# Patient Record
Sex: FEMALE | Race: WHITE | NOT HISPANIC OR LATINO | Employment: FULL TIME | URBAN - METROPOLITAN AREA
[De-identification: names, ages, dates, MRNs, and addresses within clinical notes are randomized per-mention and may not be internally consistent; named-entity substitution may affect disease eponyms.]

---
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
## 2023-11-12 LAB
ALBUMIN SERPL-MCNC: 4.1 G/DL (ref 3.5–5.2)
ALBUMIN/GLOB SERPL: 1.4 G/DL
ALP SERPL-CCNC: 62 U/L (ref 39–117)
ALT SERPL W P-5'-P-CCNC: 33 U/L (ref 1–33)
ANION GAP SERPL CALCULATED.3IONS-SCNC: 10 MMOL/L (ref 5–15)
AST SERPL-CCNC: 19 U/L (ref 1–32)
B-HCG UR QL: POSITIVE
BASOPHILS # BLD AUTO: 0.08 10*3/MM3 (ref 0–0.2)
BASOPHILS NFR BLD AUTO: 0.5 % (ref 0–1.5)
BILIRUB SERPL-MCNC: 0.2 MG/DL (ref 0–1.2)
BILIRUB UR QL STRIP: NEGATIVE
BUN SERPL-MCNC: 9 MG/DL (ref 6–20)
BUN/CREAT SERPL: 13.2 (ref 7–25)
CALCIUM SPEC-SCNC: 9.2 MG/DL (ref 8.6–10.5)
CHLORIDE SERPL-SCNC: 105 MMOL/L (ref 98–107)
CLARITY UR: CLEAR
CO2 SERPL-SCNC: 25 MMOL/L (ref 22–29)
COLOR UR: YELLOW
CREAT SERPL-MCNC: 0.68 MG/DL (ref 0.57–1)
DEPRECATED RDW RBC AUTO: 41.5 FL (ref 37–54)
EGFRCR SERPLBLD CKD-EPI 2021: 128 ML/MIN/1.73
EOSINOPHIL # BLD AUTO: 0.28 10*3/MM3 (ref 0–0.4)
EOSINOPHIL NFR BLD AUTO: 1.8 % (ref 0.3–6.2)
ERYTHROCYTE [DISTWIDTH] IN BLOOD BY AUTOMATED COUNT: 12.7 % (ref 12.3–15.4)
EXPIRATION DATE: ABNORMAL
GLOBULIN UR ELPH-MCNC: 2.9 GM/DL
GLUCOSE SERPL-MCNC: 84 MG/DL (ref 65–99)
GLUCOSE UR STRIP-MCNC: NEGATIVE MG/DL
HCT VFR BLD AUTO: 45.3 % (ref 34–46.6)
HGB BLD-MCNC: 14.5 G/DL (ref 12–15.9)
HGB UR QL STRIP.AUTO: NEGATIVE
IMM GRANULOCYTES # BLD AUTO: 0.06 10*3/MM3 (ref 0–0.05)
IMM GRANULOCYTES NFR BLD AUTO: 0.4 % (ref 0–0.5)
INTERNAL NEGATIVE CONTROL: NEGATIVE
INTERNAL POSITIVE CONTROL: POSITIVE
KETONES UR QL STRIP: NEGATIVE
LEUKOCYTE ESTERASE UR QL STRIP.AUTO: NEGATIVE
LIPASE SERPL-CCNC: 23 U/L (ref 13–60)
LYMPHOCYTES # BLD AUTO: 4.01 10*3/MM3 (ref 0.7–3.1)
LYMPHOCYTES NFR BLD AUTO: 25.5 % (ref 19.6–45.3)
Lab: ABNORMAL
MCH RBC QN AUTO: 28.4 PG (ref 26.6–33)
MCHC RBC AUTO-ENTMCNC: 32 G/DL (ref 31.5–35.7)
MCV RBC AUTO: 88.6 FL (ref 79–97)
MONOCYTES # BLD AUTO: 1.08 10*3/MM3 (ref 0.1–0.9)
MONOCYTES NFR BLD AUTO: 6.9 % (ref 5–12)
NEUTROPHILS NFR BLD AUTO: 10.2 10*3/MM3 (ref 1.7–7)
NEUTROPHILS NFR BLD AUTO: 64.9 % (ref 42.7–76)
NITRITE UR QL STRIP: NEGATIVE
NRBC BLD AUTO-RTO: 0 /100 WBC (ref 0–0.2)
PH UR STRIP.AUTO: 6 [PH] (ref 5–8)
PLATELET # BLD AUTO: 406 10*3/MM3 (ref 140–450)
PMV BLD AUTO: 10.2 FL (ref 6–12)
POTASSIUM SERPL-SCNC: 3.8 MMOL/L (ref 3.5–5.2)
PROT SERPL-MCNC: 7 G/DL (ref 6–8.5)
PROT UR QL STRIP: NEGATIVE
RBC # BLD AUTO: 5.11 10*6/MM3 (ref 3.77–5.28)
SODIUM SERPL-SCNC: 140 MMOL/L (ref 136–145)
SP GR UR STRIP: 1.02 (ref 1–1.03)
UROBILINOGEN UR QL STRIP: NORMAL
WBC NRBC COR # BLD: 15.71 10*3/MM3 (ref 3.4–10.8)

## 2023-11-12 PROCEDURE — 36415 COLL VENOUS BLD VENIPUNCTURE: CPT

## 2023-11-12 PROCEDURE — 83690 ASSAY OF LIPASE: CPT | Performed by: EMERGENCY MEDICINE

## 2023-11-12 PROCEDURE — 84702 CHORIONIC GONADOTROPIN TEST: CPT | Performed by: EMERGENCY MEDICINE

## 2023-11-12 PROCEDURE — 99284 EMERGENCY DEPT VISIT MOD MDM: CPT

## 2023-11-12 PROCEDURE — 85025 COMPLETE CBC W/AUTO DIFF WBC: CPT | Performed by: EMERGENCY MEDICINE

## 2023-11-12 PROCEDURE — 81003 URINALYSIS AUTO W/O SCOPE: CPT | Performed by: EMERGENCY MEDICINE

## 2023-11-12 PROCEDURE — 81025 URINE PREGNANCY TEST: CPT | Performed by: EMERGENCY MEDICINE

## 2023-11-12 PROCEDURE — 80053 COMPREHEN METABOLIC PANEL: CPT | Performed by: EMERGENCY MEDICINE

## 2023-11-12 RX ORDER — ACETAMINOPHEN 500 MG
1000 TABLET ORAL ONCE
Status: COMPLETED | OUTPATIENT
Start: 2023-11-12 | End: 2023-11-13

## 2023-11-12 RX ORDER — SODIUM CHLORIDE 9 MG/ML
10 INJECTION INTRAVENOUS AS NEEDED
Status: DISCONTINUED | OUTPATIENT
Start: 2023-11-12 | End: 2023-11-13 | Stop reason: HOSPADM

## 2023-11-13 ENCOUNTER — APPOINTMENT (OUTPATIENT)
Dept: ULTRASOUND IMAGING | Facility: HOSPITAL | Age: 18
End: 2023-11-13
Payer: MEDICAID

## 2023-11-13 ENCOUNTER — HOSPITAL ENCOUNTER (EMERGENCY)
Facility: HOSPITAL | Age: 18
Discharge: HOME | End: 2023-11-13
Attending: EMERGENCY MEDICINE | Admitting: EMERGENCY MEDICINE
Payer: MEDICAID

## 2023-11-13 VITALS
SYSTOLIC BLOOD PRESSURE: 126 MMHG | HEIGHT: 63 IN | RESPIRATION RATE: 18 BRPM | TEMPERATURE: 97.6 F | HEART RATE: 92 BPM | WEIGHT: 252 LBS | DIASTOLIC BLOOD PRESSURE: 77 MMHG | BODY MASS INDEX: 44.65 KG/M2 | OXYGEN SATURATION: 100 %

## 2023-11-13 DIAGNOSIS — R10.9 ABDOMINAL PAIN DURING PREGNANCY IN FIRST TRIMESTER: Primary | ICD-10-CM

## 2023-11-13 DIAGNOSIS — O26.891 ABDOMINAL PAIN DURING PREGNANCY IN FIRST TRIMESTER: Primary | ICD-10-CM

## 2023-11-13 LAB
HCG INTACT+B SERPL-ACNC: NORMAL MIU/ML
HOLD SPECIMEN: NORMAL
HOLD SPECIMEN: NORMAL
WHOLE BLOOD HOLD COAG: NORMAL
WHOLE BLOOD HOLD SPECIMEN: NORMAL

## 2023-11-13 PROCEDURE — 25810000003 SODIUM CHLORIDE 0.9 % SOLUTION: Performed by: EMERGENCY MEDICINE

## 2023-11-13 PROCEDURE — 76817 TRANSVAGINAL US OBSTETRIC: CPT

## 2023-11-13 RX ADMIN — SODIUM CHLORIDE 1000 ML: 9 INJECTION, SOLUTION INTRAVENOUS at 02:29

## 2023-11-13 RX ADMIN — ACETAMINOPHEN 1000 MG: 500 TABLET ORAL at 02:33

## 2023-11-13 NOTE — ED PROVIDER NOTES
Subjective   History of Present Illness  This is a 18-year-old female presented to the emergency department with some right-sided abdominal discomfort.  Patient has had this for the last 4 days or so.  Patient states that she is currently pregnant.  Roughly 6 weeks.  G1, P0.  She start developing some pain in the right lower side.  States that it radiates up and around her back to her right upper side.  Been consistent in nature.  She has been mildly nauseous.  Denies any vomiting.  Patient has not had any vaginal discharge or bleeding.  Denies any trauma.  She does not have any headache or change in vision.  No focal weakness.  No chest pain or shortness of breath    History provided by:  Patient   used: No        Review of Systems   Constitutional:  Negative for chills and fever.   HENT:  Negative for congestion, ear pain and sore throat.    Eyes:  Negative for visual disturbance.   Respiratory:  Negative for shortness of breath.    Cardiovascular:  Negative for chest pain.   Gastrointestinal:  Positive for abdominal pain.   Genitourinary:  Negative for difficulty urinating.   Musculoskeletal:  Negative for arthralgias.   Skin:  Negative for rash.   Neurological:  Negative for dizziness, weakness and numbness.   Psychiatric/Behavioral:  Negative for agitation.        No past medical history on file.    No Known Allergies    No past surgical history on file.    No family history on file.    Social History     Socioeconomic History    Marital status: Single   Tobacco Use    Smoking status: Every Day    Smokeless tobacco: Never   Vaping Use    Vaping Use: Every day           Objective   Physical Exam  Vitals and nursing note reviewed.   Constitutional:       General: She is not in acute distress.     Appearance: She is not ill-appearing or toxic-appearing.   HENT:      Mouth/Throat:      Pharynx: No posterior oropharyngeal erythema.   Eyes:      Conjunctiva/sclera: Conjunctivae normal.       Pupils: Pupils are equal, round, and reactive to light.   Cardiovascular:      Rate and Rhythm: Normal rate and regular rhythm.   Pulmonary:      Effort: Pulmonary effort is normal. No respiratory distress.   Abdominal:      General: Abdomen is flat. There is no distension.      Palpations: There is no mass.      Tenderness: There is no abdominal tenderness. There is no guarding or rebound.   Musculoskeletal:         General: No deformity. Normal range of motion.   Skin:     General: Skin is warm.      Findings: No rash.   Neurological:      General: No focal deficit present.      Mental Status: She is alert and oriented to person, place, and time.      Motor: No weakness.         Procedures           ED Course  ED Course as of 11/13/23 0345   Mon Nov 13, 2023   0300 BP: 128/81 [JK]   0300 Temp src: Oral [JK]   0300 Heart Rate: 72 [JK]   0300 Resp: 18 [JK]   0300 SpO2: 99 %  Patient's repeat vitals, telemetry tracing, and pulse oximetry tracing were directly viewed and interpreted by myself.  Patient is hemodynamically stable [JK]   0300 Urinalysis With Microscopic If Indicated (No Culture) - Urine, Clean Catch [JK]   0300 hCG, Quantitative, Pregnancy [JK]   0300 POC Urine Pregnancy(!) [JK]   0300 Lipase [JK]   0300 Comprehensive Metabolic Panel [JK]   0300 CBC & Differential(!)  Laboratory studies were reviewed and interpreted directly by myself.  Urinalysis was normal, hCG quant was 15,568, lipase normal, CMP normal, CBC shows mild leukocytosis with a white blood cell count of 15.71 [JK]   0343 US Ob Transvaginal  Imaging was directly visualized by myself, per my interpretations, transvaginal ultrasound revealed yolk sac. [JK]   0343 I have discussed with the patient regarding the findings.  She is pain-free at this time.  Tolerating oral intake.  Repeat abdominal examination is benign.  Findings of the ultrasound are early and difficult to rule out ectopic pregnancy.  She will need a follow-up hCG quant by her  primary OB/GYN within 72 hours.  Patient given strict return precautions.  Verbalized understanding. [JK]   3089 I had a discussion with the patient/family regarding diagnosis, diagnostic results, treatment plan, and medications. The patient/family indicated understanding of these instructions. I spent adequate time at the bedside prior to discharge necessary to discuss the aftercare instructions, giving patient education, providing explanations of the results of our evaluations/findings, and my decision making to assure that the patient/family understand the plan of care. Time was allotted to answer questions at that time and throughout the ED course. Patient is required to maintain timely follow up, as discussed. I also discussed the potential for the development of an acute emergent condition requiring further evaluation, return to the ER, admission, or even surgical intervention.  I encouraged the patient to return to the emergency department immediately for any concerns, worsening symptoms, new complaints, or if symptoms persist and they are unable to seek follow-up in a timely fashion. The patient/family expressed understanding and agreement with this plan    Shared decision making:   After full review of the patient's clinical presentation, review of any work-up including but not limited to laboratory studies and radiology obtained, I had a discussion with the patient.  Treatment options were discussed as well as the risks, benefits and consequences.  I discussed all findings with the patient and family members if available.  During the discussion, treatment goals were understood by all as well as any misconceptions which were addressed with the patient.  Ample time was given for any questions they may have had.  They are in agreement with the treatment plan as well as final disposition. [JK]      ED Course User Index  [JK] Emerson Lundy MD                                           Medical Decision  Making  This is a 18-year-old female with a history of pregnancy presenting to the emergency department with some abdominal discomfort.  Patient is roughly 6 weeks pregnant, G1, P0.  Patient symptoms seem consistent with mild pain in pregnancy.  She does not have any vaginal discharge or bleeding.  However there is concern for ectopic pregnancy.  On exam she her examination is benign.  No evidence of acute abdomen or peritonitis.  Patient is hemodynamically stable.  IV X established.  Work-up initiated.      Differential diagnosis: Pain with pregnancy, ectopic pregnancy, UTI, nephrolithiasis, acute kidney injury, electrolyte abnormality      Amount and/or Complexity of Data Reviewed  Labs: ordered. Decision-making details documented in ED Course.  Radiology: ordered and independent interpretation performed. Decision-making details documented in ED Course.    Risk  OTC drugs.  Prescription drug management.        Final diagnoses:   Abdominal pain during pregnancy in first trimester       ED Disposition  ED Disposition       ED Disposition   Discharge    Condition   Stable    Comment   --               Belinda Kirk MD  [address] [address]  [phone]    Call in 1 day           Medication List      No changes were made to your prescriptions during this visit.            Emerson Lundy MD  11/13/23 5708

## 2023-11-20 ENCOUNTER — TELEPHONE (OUTPATIENT)
Dept: OBSTETRICS AND GYNECOLOGY | Facility: CLINIC | Age: 18
End: 2023-11-20
Payer: MEDICAID

## 2023-11-20 NOTE — TELEPHONE ENCOUNTER
ED Notes in Epic from 11/13/2023.     Called and spoke with patient, she states that at this time she is not feeling any better from her ED visit last week.  She is having continued right side pain from lower abdominal and goes up side into back a bit, and she is having some pelvic pain as well.  She denies any bleeding/spotting.  LMP was 9/30/2023.    Routing to provider for review of ED records and she what they would recommend.     Did cover ED precautions for severe pain, bleeding, etc.

## 2023-11-20 NOTE — TELEPHONE ENCOUNTER
Called attempting to reach patient; no answer, LVM requesting call back and provided office call back number.    Routing this to Teresa ROSARIO as well in case they would want to work patient in tomorrow (I am unable to do so as Dr. Kirk is fully booked with no openings, etc, and no override)

## 2023-11-20 NOTE — TELEPHONE ENCOUNTER
I reviewed the ER records and there was an intrauterine pregnancy which is reassuring.    Has she tried Tylenol for her pain?    If she is having severe pain especially if associated with nausea or vomiting I would recommend representation to the ER.    Are we able to get her in for an ultrasound and visit tomorrow?    Thanks, Belinda Kirk MD

## 2023-11-20 NOTE — TELEPHONE ENCOUNTER
Caller: Liliam Romero    Relationship: Self    Best call back number:[phone]      Who are you requesting to speak with (clinical staff, DR. GRAY      What was the call regarding: PATIENT WAS SEEN IN ER FOR SIDE PAIN AND WANTS TO KNOW IF SHE CAN BE SEEN SOONER. PATIENT ADVISED THAT SHE IS VERY WORRIED

## 2023-11-21 ENCOUNTER — INITIAL PRENATAL (OUTPATIENT)
Dept: OBSTETRICS AND GYNECOLOGY | Facility: CLINIC | Age: 18
End: 2023-11-21
Payer: MEDICAID

## 2023-11-21 VITALS — WEIGHT: 273 LBS | BODY MASS INDEX: 48.36 KG/M2 | DIASTOLIC BLOOD PRESSURE: 70 MMHG | SYSTOLIC BLOOD PRESSURE: 114 MMHG

## 2023-11-21 DIAGNOSIS — Z80.41 FAMILY HISTORY OF OVARIAN CANCER: ICD-10-CM

## 2023-11-21 DIAGNOSIS — Z34.01 ENCOUNTER FOR SUPERVISION OF NORMAL FIRST PREGNANCY IN FIRST TRIMESTER: Primary | ICD-10-CM

## 2023-11-21 DIAGNOSIS — O99.210 OBESITY IN PREGNANCY, ANTEPARTUM: ICD-10-CM

## 2023-11-21 PROBLEM — R82.5 POSITIVE URINE DRUG SCREEN: Status: ACTIVE | Noted: 2023-11-21

## 2023-11-21 LAB
AMPHET+METHAMPHET UR QL: NEGATIVE
AMPHETAMINES UR QL: NEGATIVE
BARBITURATES UR QL SCN: NEGATIVE
BENZODIAZ UR QL SCN: NEGATIVE
BUPRENORPHINE SERPL-MCNC: NEGATIVE NG/ML
C TRACH RRNA SPEC DONR QL NAA+PROBE: NEGATIVE
CANNABINOIDS SERPL QL: POSITIVE
COCAINE UR QL: NEGATIVE
FENTANYL UR-MCNC: NEGATIVE NG/ML
METHADONE UR QL SCN: NEGATIVE
N GONORRHOEA DNA SPEC QL NAA+PROBE: NEGATIVE
OPIATES UR QL: NEGATIVE
OXYCODONE UR QL SCN: NEGATIVE
PCP UR QL SCN: NEGATIVE
TRICYCLICS UR QL SCN: NEGATIVE

## 2023-11-21 PROCEDURE — 87086 URINE CULTURE/COLONY COUNT: CPT | Performed by: OBSTETRICS & GYNECOLOGY

## 2023-11-21 PROCEDURE — 80307 DRUG TEST PRSMV CHEM ANLYZR: CPT | Performed by: OBSTETRICS & GYNECOLOGY

## 2023-11-21 RX ORDER — METOCLOPRAMIDE 10 MG/1
10 TABLET ORAL EVERY 6 HOURS PRN
Qty: 20 TABLET | Refills: 1 | Status: SHIPPED | OUTPATIENT
Start: 2023-11-21

## 2023-11-21 RX ORDER — PRENATAL VIT/IRON FUM/FOLIC AC 27MG-0.8MG
TABLET ORAL DAILY
COMMUNITY

## 2023-11-21 RX ORDER — DIPHENHYDRAMINE HYDROCHLORIDE 25 MG/1
25 CAPSULE ORAL 2 TIMES DAILY PRN
Qty: 30 TABLET | Refills: 1 | Status: SHIPPED | OUTPATIENT
Start: 2023-11-21

## 2023-11-21 RX ORDER — FAMOTIDINE 20 MG/1
20 TABLET, FILM COATED ORAL 2 TIMES DAILY
Qty: 60 TABLET | Refills: 3 | Status: SHIPPED | OUTPATIENT
Start: 2023-11-21 | End: 2024-11-20

## 2023-11-21 NOTE — PROGRESS NOTES
Subjective   Chief Complaint   Patient presents with    Initial Prenatal Visit     US done today       Liliam Romero is a 18 y.o. year old .  Patient's last menstrual period was 2023.  She presents to be seen to initiate prenatal care.  She reports having some pain on the right side near the buttock Area that will radiate down the leg and around the back.  She denies any excessive issues with nausea vomiting.  She reports she does use marijuana for intermittent nausea but is trying to cut back.  She is also try to cut back on vaping.  She has questions about heartburn in pregnancy and what she can take and also whether she can take an red dye during pregnancy specifically with hot punch.    Social History    Tobacco Use      Smoking status: Every Day      Smokeless tobacco: Never      Tobacco comments: Vaping       The following portions of the patient's history were reviewed and updated as appropriate:vital signs, allergies, current medications, past family history, past medical history, past social history, past surgical history, and problem list.    Objective   /70   Wt 124 kg (273 lb)   LMP 2023   BMI 48.36 kg/m²     General: well developed; well nourished  no acute distress   Skin: No suspicious lesions seen   Thyroid: normal to inspection and palpation   Heart:  Not performed.   Lungs: breathing is unlabored   Breasts:  Examined in supine position  Symmetric without masses or skin dimpling  Nipples normal without inversion, lesions or discharge  There are no palpable axillary nodes   Abdomen: soft, non-tender; no masses  no umbilical or inguinal hernias are present  no hepato-splenomegaly   Pelvis: Clinical staff was present for exam  External genitalia:  normal appearance of the external genitalia including Bartholin's and Clute's glands.  :  urethral meatus normal;  Vaginal:  normal pink mucosa without prolapse or lesions.  Cervix:  normal appearance.  Uterus:  normal size, shape  and consistency.  Adnexa:  normal bimanual exam of the adnexa.  Rectal:  digital rectal exam not performed; anus visually normal appearing.       Lab Review   No data reviewed    Imaging   Pelvic ultrasound report    Assessment & Plan   ASSESSMENT  18 y.o. year old  at 7w3d confirmed by u/s today   Supervision of low risk pregnancy  Obesity in pregnancy   Family history of ovarian cancer   Heartburn in pregnancy     PLAN  The problem list for pregnancy was initiated today  Tests ordered today:  Orders Placed This Encounter   Procedures    Chlamydia trachomatis, Neisseria gonorrhoeae, Trichomonas vaginalis, PCR - Swab, Cervix     Standing Status:   Future     Standing Expiration Date:   2023     Order Specific Question:   Release to patient     Answer:   Routine Release [[id number]]    Urine Culture - Urine, Urine, Clean Catch     Standing Status:   Future     Standing Expiration Date:   2024     Order Specific Question:   Release to patient     Answer:   Routine Release [[id number]]    OB Panel With HIV     Standing Status:   Future     Standing Expiration Date:   2024     Order Specific Question:   Release to patient     Answer:   Routine Release [[id number]]    Hepatitis C Antibody     Standing Status:   Future     Standing Expiration Date:   2024     Order Specific Question:   Release to patient     Answer:   Routine Release [[id number]]    Urine Drug Screen - Urine, Clean Catch     Please confirm all positive UDS     Standing Status:   Future     Standing Expiration Date:   2024     Order Specific Question:   Release to patient     Answer:   Routine Release [[id number]]    [facility] Multi-Cancer (2+38) - Blood,     ==========Department Information==========    ID: [id number]    Department:[facility]  [address] [address]  Dept: [phone]  Dept Fax: [phone]  Loc: [phone]  Loc Fax:  [phone]       Standing Status:   Future     Standing Expiration Date:   11/21/2024     Order Specific Question:   Is this order for the Family Testing Program?     Answer:   Yes     Order Specific Question:   Does this patient have a known family history of cancer?     Answer:   Yes-complete paperwork and place in kit     Order Specific Question:   Does this patient have a personal history of cancer?     Answer:   No     Order Specific Question:   What type of billing?     Answer:   Bill Insurance     Order Specific Question:   Patient and physician allow [facility] to share order details with 3rd party genetic counselor?     Answer:   Yes     Order Specific Question:   Did patient sign the Patient Acknowledgement?     Answer:   Yes     Order Specific Question:   Did the ordering clinician sign the Statement of Informed Consent?     Answer:   Yes     Order Specific Question:   Blood draw managed by [facility]?     Answer:   No     Order Specific Question:   Release to patient     Answer:   Routine Release [[id number]]    [facility] Silva Prenatal Test: Chromosomes 13, 18, 21, X & Y: Triploidy 22Q.11.2 Deletion - Blood,     ==========Department Information==========    ID: [id number]    Department:[facility]  [address] [facility] [address]  [facility] [address]  Dept: [phone]  Dept Fax: [phone]  Loc: [phone]  Loc Fax: [phone]       Standing Status:   Future     Standing Expiration Date:   11/21/2024     Order Specific Question:   Is patient pregnant?     Answer:   Yes     Order Specific Question:   Expected due date (MM/DD/YYYY):     Answer:   7/6/2024     Order Specific Question:   Is this a twin pregnancy? (viable, no vanished twin)     Answer:   No     Order Specific Question:   Is this a surrogate or egg donor pregnancy?     Answer:   No     Order Specific Question:   I want fetal sex included in the report:     Answer:   Yes     Order Specific  Question:   Patient's weight (lbs):     Answer:   273     Order Specific Question:   Opt out of 22q11.2?     Answer:   No     Order Specific Question:   Enroll this patient in the Automatic Redraw Program?     Answer:   Yes     Order Specific Question:   What type of billing?     Answer:   Bill Insurance     Order Specific Question:   Did patient sign the Patient Acknowledgement?     Answer:   Yes     Order Specific Question:   Did the ordering clinician sign the Statement of Informed Consent?     Answer:   Yes     Order Specific Question:   Blood draw managed by [facility]?     Answer:   No     Order Specific Question:   Release to patient     Answer:   Routine Release [[id number]]    [facility] Horizon 2(CF & SMA) - Blood,     ==========Department Information==========    ID: [id number]    Department:[facility]BGYN  [address] [facility] [address]  [facility] [address]  Dept: [phone]  Dept Fax: [phone]  Loc: [phone]  Loc Fax: [phone]       Standing Status:   Future     Standing Expiration Date:   11/21/2024     Order Specific Question:   Is patient pregnant?     Answer:   Yes     Order Specific Question:   Ethnicity of patient:     Answer:        Order Specific Question:   Did the ordering clinician sign the Statement of Informed Consent?     Answer:   Yes     Order Specific Question:   Is patient currently using hormonal medications?     Answer:   No     Order Specific Question:   What type of billing?     Answer:   Bill Insurance     Order Specific Question:   Patient authorizes [facility] to share patient's Horizon test results with partner and their medical provider?     Answer:   No     Order Specific Question:   Practice ensures that HIPAA consent is obtained and will make available to [facility] upon request?     Answer:   Yes     Order Specific Question:   Did patient sign the Patient Acknowledgement?     Answer:   Yes     Order Specific Question:    Do you want [facility] to schedule mobile phlebotomy for this patient?     Answer:   No     Order Specific Question:   [facility] add-on test?     Answer:   No     Order Specific Question:   Release to patient     Answer:   Routine Release [[id number]]     Testing for GC / Chlamydia / trichomonas was done today  Genetic testing reviewed: NIPT (Panorama), carrier screening, and they have already decided to have testing performed.  We also reviewed the option of genetic screening for herself personally given the family history of ovarian cancer.  She desires to proceed.  Please see above  Pap was done today.  If she does not receive the results of the Pap within 2 weeks  time, she was instructed to call to find out the results.  I explained to Liliam that the recommendations for Pap smear interval in a low risk patient's has lengthened to 3 years time.  I encouraged her to be seen yearly for a full physical exam including breast and pelvic exam even during the off years when PAP's will not be performed.  Reviewed my current pregnant state and RADHA  Information reviewed: exercise in pregnancy, nutrition in pregnancy, weight gain in pregnancy, work and travel restrictions during pregnancy, list of OTC medications acceptable in pregnancy, call coverage groups, and vaccinations in pregnancy at this GA (influenza and COVID-19 booster)  Reviewed from what I can see you in regards to red dye would definitely avoid during the first trimester and then only drink anything with red dye sparingly throughout the remainder of the pregnancy.    Total time spent today with Liliam  was 45-59 minutes (level 4).  Of this time, > 50% was spent face-to-face time coordinating care, answering her questions and counseling regarding pathophysiology of her presenting problem along with plans for any diagnositc work-up and treatment.    Follow up: 4 week(s)     New Medications Ordered This Visit   Medications    doxylamine (UNISOM) 25 MG tablet      Sig: Take 1 tablet by mouth At Night As Needed for Nausea.     Dispense:  30 tablet     Refill:  1    vitamin B-6 (PYRIDOXINE) 25 MG tablet     Sig: Take 1 tablet by mouth 2 (Two) Times a Day As Needed (nausea).     Dispense:  30 tablet     Refill:  1    metoclopramide (Reglan) 10 MG tablet     Sig: Take 1 tablet by mouth Every 6 (Six) Hours As Needed (nausea, vomiting).     Dispense:  20 tablet     Refill:  1    famotidine (Pepcid) 20 MG tablet     Sig: Take 1 tablet by mouth 2 (Two) Times a Day.     Dispense:  60 tablet     Refill:  3       This note was electronically signed.    Belinda Kirk MD  November 21, 2023

## 2023-11-21 NOTE — TELEPHONE ENCOUNTER
Spoke with pt and she stated that she has tried Tylenol and it is not helping.  She has been scheduled to see Dr. Kirk as a New OB with an ultrasound this afternoon.

## 2023-11-22 ENCOUNTER — PATIENT ROUNDING (BHMG ONLY) (OUTPATIENT)
Dept: OBSTETRICS AND GYNECOLOGY | Facility: CLINIC | Age: 18
End: 2023-11-22
Payer: MEDICAID

## 2023-11-22 LAB — BACTERIA SPEC AEROBE CULT: NO GROWTH

## 2023-11-22 NOTE — PROGRESS NOTES
A [employer] message has been sent to the patient for [place] [facility]

## 2023-11-27 ENCOUNTER — DOCUMENTATION (OUTPATIENT)
Dept: OBSTETRICS AND GYNECOLOGY | Facility: CLINIC | Age: 18
End: 2023-11-27
Payer: MEDICAID

## 2023-12-18 ENCOUNTER — TELEPHONE (OUTPATIENT)
Dept: OBSTETRICS AND GYNECOLOGY | Facility: CLINIC | Age: 18
End: 2023-12-18

## 2023-12-18 DIAGNOSIS — Z3A.11 11 WEEKS GESTATION OF PREGNANCY: ICD-10-CM

## 2023-12-18 DIAGNOSIS — N93.9 VAGINAL SPOTTING: Primary | ICD-10-CM

## 2023-12-18 NOTE — TELEPHONE ENCOUNTER
Spoke with pt and informed her that Dr. Kirk would like for her to have an ultrasound done before her appt.  Pt was also informed that while Dr. Kirk is on maternity leave the plan is for her to see the nurse practitioners and then she will take back over when she returns.  Pt verbalized understanding and had no questions at this time.  Pt was also informed to have her lab work done either tomorrow before her appt or she would need to go afterwards.

## 2023-12-18 NOTE — TELEPHONE ENCOUNTER
Orders Placed This Encounter   Procedures    US Ob Limited 1 + Fetuses     Standing Status:   Future     Standing Expiration Date:   12/17/2024     Order Specific Question:   Reason for Exam:     Answer:   vaginal spotting in pregnancy     Order Specific Question:   Release to patient     Answer:   Routine Release [[id number]]     I have placed an order for ultrasound this week with her appointment.  Please let her know the plan will be since her due date is after I will return for maternity leave that we would have her see one of our midwife providers during my absence and I would take over when I return.  If she desires to essentially transfer care completely then probably the best approach would be to provide her the number to call their office.    Thanks, Belinda Kirk MD

## 2023-12-18 NOTE — TELEPHONE ENCOUNTER
Provider: DR.BAYLON    Caller: MERY FLOREZ     Relationship to Patient: SELF    Phone Number: [phone]  CALL ANYTIME, IT IS OKAY TO LVM.    Reason for Call: OB PATIENT HAD SOME LIGHT BROWN DISCHARGE ON 12/16/23. DISCHARGE WAS ONLY NOTICED A COUPLE TIMES ON SATURDAY WHEN USING THE RESTROOM. NO PAIN OR CRAMPING. PATIENT WANTS KNOW IF AN ULTRASOUND IS NEEDED WITH OB FOLLOW UP ON 12/19/23.    PATIENT WOULD ALSO LIKE TO DISCUSS TRANSFERRING OB CARE TO  ONCE  GOES ON MATERNITY LEAVE.

## 2023-12-19 ENCOUNTER — ROUTINE PRENATAL (OUTPATIENT)
Dept: OBSTETRICS AND GYNECOLOGY | Facility: CLINIC | Age: 18
End: 2023-12-19
Payer: MEDICAID

## 2023-12-19 ENCOUNTER — LAB (OUTPATIENT)
Dept: LAB | Facility: HOSPITAL | Age: 18
End: 2023-12-19
Payer: MEDICAID

## 2023-12-19 VITALS — BODY MASS INDEX: 46.94 KG/M2 | SYSTOLIC BLOOD PRESSURE: 110 MMHG | DIASTOLIC BLOOD PRESSURE: 70 MMHG | WEIGHT: 265 LBS

## 2023-12-19 DIAGNOSIS — M41.9 SCOLIOSIS, UNSPECIFIED SCOLIOSIS TYPE, UNSPECIFIED SPINAL REGION: ICD-10-CM

## 2023-12-19 DIAGNOSIS — Z34.01 ENCOUNTER FOR SUPERVISION OF NORMAL FIRST PREGNANCY IN FIRST TRIMESTER: ICD-10-CM

## 2023-12-19 DIAGNOSIS — Z34.01 ENCOUNTER FOR SUPERVISION OF NORMAL FIRST PREGNANCY IN FIRST TRIMESTER: Primary | ICD-10-CM

## 2023-12-19 DIAGNOSIS — O99.210 OBESITY IN PREGNANCY, ANTEPARTUM: ICD-10-CM

## 2023-12-19 LAB
ABO GROUP BLD: NORMAL
BASOPHILS # BLD AUTO: 0.03 10*3/MM3 (ref 0–0.2)
BASOPHILS NFR BLD AUTO: 0.2 % (ref 0–1.5)
BLD GP AB SCN SERPL QL: NEGATIVE
DEPRECATED RDW RBC AUTO: 37.3 FL (ref 37–54)
EOSINOPHIL # BLD AUTO: 0.1 10*3/MM3 (ref 0–0.4)
EOSINOPHIL NFR BLD AUTO: 0.7 % (ref 0.3–6.2)
ERYTHROCYTE [DISTWIDTH] IN BLOOD BY AUTOMATED COUNT: 12.7 % (ref 12.3–15.4)
EXTERNAL CYSTIC FIBROSIS: NEGATIVE
GLUCOSE UR STRIP-MCNC: NEGATIVE MG/DL
HCT VFR BLD AUTO: 40.7 % (ref 34–46.6)
HGB BLD-MCNC: 13.2 G/DL (ref 12–15.9)
IMM GRANULOCYTES # BLD AUTO: 0.05 10*3/MM3 (ref 0–0.05)
IMM GRANULOCYTES NFR BLD AUTO: 0.4 % (ref 0–0.5)
LYMPHOCYTES # BLD AUTO: 2.69 10*3/MM3 (ref 0.7–3.1)
LYMPHOCYTES NFR BLD AUTO: 19.9 % (ref 19.6–45.3)
MCH RBC QN AUTO: 26.9 PG (ref 26.6–33)
MCHC RBC AUTO-ENTMCNC: 32.4 G/DL (ref 31.5–35.7)
MCV RBC AUTO: 83.1 FL (ref 79–97)
MONOCYTES # BLD AUTO: 0.76 10*3/MM3 (ref 0.1–0.9)
MONOCYTES NFR BLD AUTO: 5.6 % (ref 5–12)
NEUTROPHILS NFR BLD AUTO: 73.2 % (ref 42.7–76)
NEUTROPHILS NFR BLD AUTO: 9.86 10*3/MM3 (ref 1.7–7)
NRBC BLD AUTO-RTO: 0 /100 WBC (ref 0–0.2)
PLATELET # BLD AUTO: 354 10*3/MM3 (ref 140–450)
PMV BLD AUTO: 11.3 FL (ref 6–12)
PROT UR STRIP-MCNC: NEGATIVE MG/DL
RBC # BLD AUTO: 4.9 10*6/MM3 (ref 3.77–5.28)
RH BLD: POSITIVE
WBC NRBC COR # BLD AUTO: 13.49 10*3/MM3 (ref 3.4–10.8)

## 2023-12-19 PROCEDURE — 86803 HEPATITIS C AB TEST: CPT

## 2023-12-19 PROCEDURE — 80081 OBSTETRIC PANEL INC HIV TSTG: CPT

## 2023-12-19 NOTE — PROGRESS NOTES
Chief Complaint   Patient presents with    Routine Prenatal Visit     Us today, did have some light spotting when wiping on Saturday - just one instance has not happened since.       HPI: Liliam is a  currently at 11w3d who today reports the following:  Nausea - No; Vaginal bleeding -   one time episode of light spotting Saturday but none since then ; Heartburn - No.    ROS:  GI: Constipation - No; Diarrhea - No    Neuro: Headache - No; Visual change - No      EXAM:  Vitals: See prenatal flowsheet   Abdomen: See prenatal flowsheet   Urine glucose/protein: See prenatal flowsheet   Pelvic: See prenatal flowsheet     Prenatal Labs  Lab Results   Component Value Date    HGB 14.5 2023    NOI1OAP0 Nonreactive 10/16/2022    URINECX No growth 2023    CHLAMNAA negative 2023    NGONORRHON negative 2023       MDM:  Impression: Supervision of low risk pregnancy  Obesity in pregnancy    Tests done today: U/S for follow up - FHTs present, [facility]   Topics discussed: Continue with PNV's  Prenatal labs reviewed  Bleeding precautions    Tests scheduled today for her next visit:   none

## 2023-12-20 LAB
HBV SURFACE AG SERPL QL IA: NORMAL
HCV AB SER DONR QL: NORMAL
HIV 1+2 AB+HIV1 P24 AG SERPL QL IA: NORMAL
RPR SER QL: NORMAL

## 2023-12-21 LAB — RUBV IGG SERPL IA-ACNC: 6.67 INDEX

## 2023-12-24 PROBLEM — O28.5 ABNORMAL GENETIC TEST DURING PREGNANCY: Status: ACTIVE | Noted: 2023-12-24

## 2023-12-26 DIAGNOSIS — Z34.01 ENCOUNTER FOR SUPERVISION OF NORMAL FIRST PREGNANCY IN FIRST TRIMESTER: Primary | ICD-10-CM

## 2023-12-27 ENCOUNTER — DOCUMENTATION (OUTPATIENT)
Dept: OBSTETRICS AND GYNECOLOGY | Facility: CLINIC | Age: 18
End: 2023-12-27
Payer: MEDICAID

## 2023-12-27 LAB
Lab: NEGATIVE
Lab: NORMAL
Lab: NORMAL
NTRA CYSTIC FIBROSIS: NEGATIVE
NTRA SPINAL MUSCULAR ATROPHY: NEGATIVE

## 2023-12-28 ENCOUNTER — DOCUMENTATION (OUTPATIENT)
Dept: OBSTETRICS AND GYNECOLOGY | Facility: CLINIC | Age: 18
End: 2023-12-28
Payer: MEDICAID

## 2023-12-28 DIAGNOSIS — Z34.01 ENCOUNTER FOR SUPERVISION OF NORMAL FIRST PREGNANCY IN FIRST TRIMESTER: Primary | ICD-10-CM

## 2023-12-29 LAB
Lab: NORMAL
Lab: NORMAL

## 2024-01-05 ENCOUNTER — LAB (OUTPATIENT)
Dept: LAB | Facility: HOSPITAL | Age: 19
End: 2024-01-05
Payer: MEDICAID

## 2024-01-11 ENCOUNTER — TELEPHONE (OUTPATIENT)
Dept: OBSTETRICS AND GYNECOLOGY | Facility: CLINIC | Age: 19
End: 2024-01-11
Payer: MEDICAID

## 2024-01-11 NOTE — TELEPHONE ENCOUNTER
Caller: Liliam Romero    Relationship: Self    Best call back number:  [phone]    Caller requesting test results: PT    What test was performed: GENETIC    When was the test performed: 1/5/24    Where was the test performed: OFFICE     Additional notes: PT WOULD LIKE TO SPEAK TO SOMEONE IN REGARDS TO HER RESULTS - SHE IS CONCERNED AND WOULD POSSIBLY LIKE AN U/S TO CHECK THE BABY

## 2024-01-12 NOTE — TELEPHONE ENCOUNTER
Please inform patient the results of her cell free fetal DNA was insufficient due to low fetal fraction.     Low fetal fraction can be due to normal variation, gestational age, maternal weight, and certain medications.  This finding may also be associated with some chromosome abnormalities.    Given this is her second drawl I would not recommend repeating it.  The next option would be meeting with the maternal-fetal medicine doctors.  Would she like us to get that appointment moved up for her?    Thanks, Belinda Kirk MD

## 2024-01-19 ENCOUNTER — TELEPHONE (OUTPATIENT)
Dept: OBSTETRICS AND GYNECOLOGY | Facility: CLINIC | Age: 19
End: 2024-01-19

## 2024-01-19 NOTE — TELEPHONE ENCOUNTER
Spoke with pt and advised her that these were being faxed.  Pt verbalized understanding and had no questions at this time.

## 2024-01-19 NOTE — TELEPHONE ENCOUNTER
PATIENT IS NEEDING A LETTER STATING SHE IS PREGNANT FAXED TO [facility] AND MEDICA[employer] .     FAX #: [phone]      PATIENT IS ALSO NEEDING A CALL BACK GO OVER LAB RESULTS.  [phone]

## 2024-01-28 PROBLEM — Z34.02 ENCOUNTER FOR SUPERVISION OF NORMAL FIRST PREGNANCY IN SECOND TRIMESTER: Status: ACTIVE | Noted: 2023-11-21

## 2024-01-29 ENCOUNTER — ROUTINE PRENATAL (OUTPATIENT)
Dept: OBSTETRICS AND GYNECOLOGY | Facility: CLINIC | Age: 19
End: 2024-01-29
Payer: MEDICAID

## 2024-01-29 VITALS — SYSTOLIC BLOOD PRESSURE: 138 MMHG | WEIGHT: 269 LBS | BODY MASS INDEX: 47.65 KG/M2 | DIASTOLIC BLOOD PRESSURE: 88 MMHG

## 2024-01-29 DIAGNOSIS — O99.612 CONSTIPATION IN PREGNANCY IN SECOND TRIMESTER: ICD-10-CM

## 2024-01-29 DIAGNOSIS — O99.210 OBESITY IN PREGNANCY, ANTEPARTUM: ICD-10-CM

## 2024-01-29 DIAGNOSIS — K59.00 CONSTIPATION IN PREGNANCY IN SECOND TRIMESTER: ICD-10-CM

## 2024-01-29 DIAGNOSIS — O28.5 ABNORMAL GENETIC TEST DURING PREGNANCY: ICD-10-CM

## 2024-01-29 DIAGNOSIS — Z34.02 ENCOUNTER FOR SUPERVISION OF NORMAL FIRST PREGNANCY IN SECOND TRIMESTER: Primary | ICD-10-CM

## 2024-01-29 PROCEDURE — 99213 OFFICE O/P EST LOW 20 MIN: CPT | Performed by: OBSTETRICS & GYNECOLOGY

## 2024-01-29 RX ORDER — DOCUSATE SODIUM 100 MG/1
100 CAPSULE, LIQUID FILLED ORAL 2 TIMES DAILY PRN
Qty: 60 CAPSULE | Refills: 1 | Status: SHIPPED | OUTPATIENT
Start: 2024-01-29

## 2024-01-29 NOTE — PROGRESS NOTES
Chief Complaint   Patient presents with    Routine Prenatal Visit     Was having sharp RT sided pain       HPI: Liliam is a  currently at 17w2d who today reports the following:  Contractions - No; Leaking - No; Vaginal bleeding -  No; Heartburn - No.    ROS:  GI: Nausea - No ; Constipation - No; Diarrhea - No    Neuro: Headache - No ; Visual change - No      EXAM:  Vitals: See prenatal flowsheet   Abdomen: See prenatal flowsheet   Urine glucose/protein: See prenatal flowsheet   Pelvic: See prenatal flowsheet     Lab Results   Component Value Date    ABO B 2023    RH Positive 2023    ABSCRN Negative 2023       MDM:  Impression: Supervision of low risk pregnancy  Constipation in pregnancy  Obesity in pregnancy  Insufficient fetal DNA x2   Tests done today: none   Topics discussed: Continue with PNV's  Prenatal labs reviewed  Reviewed implications of #4. She would like to see [facility] sooner if able.   OTC meds for constipation    Tests scheduled today for her next visit:   U/S for anatomic screening at [facility]     New Medications Ordered This Visit   Medications    docusate sodium (Colace) 100 MG capsule     Sig: Take 1 capsule by mouth 2 (Two) Times a Day As Needed for Constipation.     Dispense:  60 capsule     Refill:  1

## 2024-02-07 ENCOUNTER — REFERRAL TRIAGE (OUTPATIENT)
Dept: LABOR AND DELIVERY | Facility: HOSPITAL | Age: 19
End: 2024-02-07
Payer: MEDICAID

## 2024-02-12 ENCOUNTER — PATIENT OUTREACH (OUTPATIENT)
Dept: LABOR AND DELIVERY | Facility: HOSPITAL | Age: 19
End: 2024-02-12
Payer: MEDICAID

## 2024-02-14 ENCOUNTER — PATIENT OUTREACH (OUTPATIENT)
Dept: LABOR AND DELIVERY | Facility: HOSPITAL | Age: 19
End: 2024-02-14
Payer: MEDICAID

## 2024-02-20 ENCOUNTER — HOSPITAL ENCOUNTER (OUTPATIENT)
Dept: WOMENS IMAGING | Facility: HOSPITAL | Age: 19
Discharge: HOME | End: 2024-02-20
Admitting: OBSTETRICS & GYNECOLOGY
Payer: MEDICAID

## 2024-02-20 ENCOUNTER — OFFICE VISIT (OUTPATIENT)
Dept: OBSTETRICS AND GYNECOLOGY | Facility: HOSPITAL | Age: 19
End: 2024-02-20
Payer: MEDICAID

## 2024-02-20 ENCOUNTER — ROUTINE PRENATAL (OUTPATIENT)
Dept: OBSTETRICS AND GYNECOLOGY | Facility: CLINIC | Age: 19
End: 2024-02-20
Payer: MEDICAID

## 2024-02-20 VITALS — WEIGHT: 271.8 LBS | DIASTOLIC BLOOD PRESSURE: 63 MMHG | BODY MASS INDEX: 48.15 KG/M2 | SYSTOLIC BLOOD PRESSURE: 115 MMHG

## 2024-02-20 VITALS — BODY MASS INDEX: 21.79 KG/M2 | WEIGHT: 123 LBS | SYSTOLIC BLOOD PRESSURE: 112 MMHG | DIASTOLIC BLOOD PRESSURE: 70 MMHG

## 2024-02-20 DIAGNOSIS — O28.5 ABNORMAL GENETIC TEST DURING PREGNANCY: Primary | ICD-10-CM

## 2024-02-20 DIAGNOSIS — O99.210 OBESITY IN PREGNANCY, ANTEPARTUM: ICD-10-CM

## 2024-02-20 DIAGNOSIS — Z34.90 PREGNANCY, UNSPECIFIED GESTATIONAL AGE: ICD-10-CM

## 2024-02-20 DIAGNOSIS — Z34.02 ENCOUNTER FOR SUPERVISION OF NORMAL FIRST PREGNANCY IN SECOND TRIMESTER: Primary | ICD-10-CM

## 2024-02-20 DIAGNOSIS — Z34.01 ENCOUNTER FOR SUPERVISION OF NORMAL FIRST PREGNANCY IN FIRST TRIMESTER: ICD-10-CM

## 2024-02-20 DIAGNOSIS — E66.01 MORBID OBESITY WITH BMI OF 45.0-49.9, ADULT: ICD-10-CM

## 2024-02-20 DIAGNOSIS — O28.5 ABNORMAL GENETIC TEST DURING PREGNANCY: ICD-10-CM

## 2024-02-20 PROCEDURE — 76811 OB US DETAILED SNGL FETUS: CPT

## 2024-02-20 NOTE — PROGRESS NOTES
Chief Complaint   Patient presents with    Routine Prenatal Visit       HPI  Liliam is a  currently at 20w3d who today reports the following:  Contractions - No; Leaking - No; Vaginal bleeding -  No; Heartburn - YES.  She does endorse some leaking from her breasts but we discussed NOT stimulating breasts at this time due to the risk of  labor. She was seen in [facility] today for her anatomy scan and will go back at 32 weeks for further evaluation.     Review of Systems   Constitutional: Negative.    Gastrointestinal: Negative.  Positive for GERD.   Neurological: Negative.    Psychiatric/Behavioral: Negative.         Objective  /70   Wt 55.8 kg (123 lb)   LMP 2023   BMI 21.79 kg/m²     Physical Exam    Lab Results   Component Value Date    ABO B 2023    RH Positive 2023    ABSCRN Negative 2023       Assessment and Plan  Diagnoses and all orders for this visit:    1. Encounter for supervision of normal first pregnancy in second trimester (Primary)    2. Abnormal genetic test during pregnancy        Continue with PNV's  Prenatal labs reviewed  OTC medical options for her heartburn- discussed avoiding triggers, Tums, and Pepcid.   Tests scheduled today for her next visit none    Return in about 4 weeks (around 3/19/2024) for prenatal visit.    Ange Cardoso, ARSALAN  2024

## 2024-02-20 NOTE — ASSESSMENT & PLAN NOTE
Prenatal screening for trisomy 21, trisomy 18, trisomy 13 and sex chromosome aneuploidies can be performed using next-generation sequencing of cell-free DNA (cfDNA) in the maternal circulation.  Circulating cfDNA is derived from both the mother and the fetal placenta unit and cleared from the maternal circulation soon after delivery.  A wide range of cfDNA failure rates have been reported (0 to 10% or more, with a consensus estimate of about 2.0%) and it is difficult to explain the variability.  Although there is no standard approach to this situation, it is common practice to retest once using a cfDNA test or to switch to a standard serum screen (e.g. combined or quadruple screen). Repeat sampling is not recommended, however, in the situation of an uninformative DNA pattern.  A targeted ultrasound is another option, with invasive diagnostic testing as an option if the pregnancy was already considered to be at “high risk.”      The most common reasons for test failure include less than a specified absolute amount of total and/or fetal/placental DNA, fetal fraction below an acceptable level (e.g. <4%), and insufficient numbers of fragments sequenced and/or aligned.  Low fetal fraction may be responsible for up to 50% of all failures.   Test failure can also occur with long stretches of homozygosity.  Examples include uniparental disomy or parental consanguinity.      The concentration of fetal cfDNA falls with increasing maternal weight and is more often insufficient for prenatal screening in obese women.  Fetal cfDNA is still produced, but the fetal fraction in maternal blood is reduced because of an increased contribution of maternal cfDNA from apoptosis of adipose tissue and/or dilution of fetal (placental) DNA in the larger maternal blood volume.  Women weighing over 180 pounds (81 kg) can be informed that their chance of having a test failure or an inaccurate result is at least three or four times higher than in  women of lower weight.      The patient has four options in this setting:      In the absence of an uninformative DNA pattern, repeat the cfDNA test as soon as possible, if allowed by the laboratory (some failures, such as large regions of homozygosity will always cause the test to fail and repeat testing is not an option).  Repeat testing, when allowed, is successful in about 80% of cases.      Standard serum marker/ultrasound screening.    Detailed ultrasonographic evaluation at 19-20 weeks' gestation for ultrasound evidence of fetal aneuploidy.    Invasive procedure (amniocentesis, CVS) and diagnostic testing (karyotyping/microarray).    Patient understands the ultrasound today appears normal with no evidence of fetal trisomy.  After careful consideration the patient declines amniocentesis.    We have scheduled a repeat ultrasound at 32 weeks gestation to look for late findings of trisomy as well as to assess fetal growth.

## 2024-02-20 NOTE — PROGRESS NOTES
No complaints today, Next f/u visit with Dr Kirk's office  today, NIPT x 2- insufficient data and low ff

## 2024-02-20 NOTE — LETTER
"2024     Belinda Kirk MD  [address] [facility]  [address] [place] [address]    Patient: Liliam Romero   YOB: 2005   Date of Visit: 2024     Dear Belinda Kirk MD:       Thank you for referring Liliam Romero to me for evaluation. Below are the relevant portions of my assessment and plan of care.    If you have questions, please do not hesitate to call me. I look forward to following Liliam along with you.         Sincerely,        Douglas A. Milligan, MD        CC: No Recipients    Milligan, Douglas A, MD  [phone]  Sign when Signing Visit  Documentation of the ultrasound findings, images, and interpretations will be available in the patient's Viewpoint report which is located in the imaging tab in chart review.    Maternal/Fetal Medicine Consult Note     Name: Liliam Romero    : 2005     MRN: [id number]     Referring Provider: Belinda Kirk MD    Chief Complaint  Mo, Anomaly scan     Subjective     History of Present Illness:  Liliam Romero is a 18 y.o.  20w3d who presents today for abn NIPT    RADHA: Estimated Date of Delivery: 24     ROS:   As noted in HPI.     Past Medical History:   Diagnosis Date   • ADHD    • Anxiety    • Depression    • OCD (obsessive compulsive disorder)    • PTSD (post-traumatic stress disorder)    • Skin rash     PERSISTENT   • Trauma       History reviewed. No pertinent surgical history.   OB History          1    Para   0    Term   0       0    AB   0    Living   0         SAB   0    IAB   0    Ectopic   0    Molar   0    Multiple   0    Live Births   0          Obstetric Comments   FOB - Arun  Fob #1 - Pregnancy #1                Objective     Vital Signs  /63   Wt 123 kg (271 lb 12.8 oz)   LMP 2023   Estimated body mass index is 48.15 kg/m² as calculated from the following:    Height as of 23: 160 cm (63\").    Weight as of this encounter: 123 kg (271 lb 12.8 " oz).    Physical Exam    Ultrasound Impression:   See viewpoint    Assessment and Plan     Liliam Romero is a 18 y.o.  20w3d who presents today for abn NIPT    Diagnoses and all orders for this visit:    1. Abnormal genetic test during pregnancy (Primary)  Assessment & Plan:  Prenatal screening for trisomy 21, trisomy 18, trisomy 13 and sex chromosome aneuploidies can be performed using next-generation sequencing of cell-free DNA (cfDNA) in the maternal circulation.  Circulating cfDNA is derived from both the mother and the fetal placenta unit and cleared from the maternal circulation soon after delivery.  A wide range of cfDNA failure rates have been reported (0 to 10% or more, with a consensus estimate of about 2.0%) and it is difficult to explain the variability.  Although there is no standard approach to this situation, it is common practice to retest once using a cfDNA test or to switch to a standard serum screen (e.g. combined or quadruple screen). Repeat sampling is not recommended, however, in the situation of an uninformative DNA pattern.  A targeted ultrasound is another option, with invasive diagnostic testing as an option if the pregnancy was already considered to be at “high risk.”      The most common reasons for test failure include less than a specified absolute amount of total and/or fetal/placental DNA, fetal fraction below an acceptable level (e.g. <4%), and insufficient numbers of fragments sequenced and/or aligned.  Low fetal fraction may be responsible for up to 50% of all failures.   Test failure can also occur with long stretches of homozygosity.  Examples include uniparental disomy or parental consanguinity.      The concentration of fetal cfDNA falls with increasing maternal weight and is more often insufficient for prenatal screening in obese women.  Fetal cfDNA is still produced, but the fetal fraction in maternal blood is reduced because of an increased contribution of  maternal cfDNA from apoptosis of adipose tissue and/or dilution of fetal (placental) DNA in the larger maternal blood volume.  Women weighing over 180 pounds (81 kg) can be informed that their chance of having a test failure or an inaccurate result is at least three or four times higher than in women of lower weight.      The patient has four options in this setting:      In the absence of an uninformative DNA pattern, repeat the cfDNA test as soon as possible, if allowed by the laboratory (some failures, such as large regions of homozygosity will always cause the test to fail and repeat testing is not an option).  Repeat testing, when allowed, is successful in about 80% of cases.      Standard serum marker/ultrasound screening.    Detailed ultrasonographic evaluation at 19-20 weeks' gestation for ultrasound evidence of fetal aneuploidy.    Invasive procedure (amniocentesis, CVS) and diagnostic testing (karyotyping/microarray).    Patient understands the ultrasound today appears normal with no evidence of fetal trisomy.  After careful consideration the patient declines amniocentesis.    We have scheduled a repeat ultrasound at 32 weeks gestation to look for late findings of trisomy as well as to assess fetal growth.    Orders:  -     [facility]; Future    2. Morbid obesity with BMI of 45.0-49.9, adult  -     [facility]; Future    3. Pregnancy, unspecified gestational age  -     [facility]; Future         Follow Up  Return in about 12 weeks (around 2024).    I spent 30 minutes caring for the patient on the day of service. This included: obtaining or reviewing a separately obtained medical history, reviewing patient records, performing a medically appropriate exam and/or evaluation, counseling or educating the patient/family/caregiver, ordering medications, labs, and/or procedures and documenting such in the medical record. This does not  include time spent on review and interpretation of other tests such as fetal ultrasound or the performance of other procedures such as amniocentesis or CVS.      Douglas A. Milligan, MD  Maternal Fetal Medicine, [facility]     2024

## 2024-02-20 NOTE — PROGRESS NOTES
"Documentation of the ultrasound findings, images, and interpretations will be available in the patient's Viewpoint report which is located in the imaging tab in chart review.    Maternal/Fetal Medicine Consult Note     Name: Liliam Romero    : 2005     MRN: [id number]     Referring Provider: Belinda Kirk MD    Chief Complaint  Mo, Anomaly scan     Subjective     History of Present Illness:  Liliam Romero is a 18 y.o.  20w3d who presents today for abn NIPT    RADHA: Estimated Date of Delivery: 24     ROS:   As noted in HPI.     Past Medical History:   Diagnosis Date    ADHD     Anxiety     Depression     OCD (obsessive compulsive disorder)     PTSD (post-traumatic stress disorder)     Skin rash     PERSISTENT    Trauma       History reviewed. No pertinent surgical history.   OB History          1    Para   0    Term   0       0    AB   0    Living   0         SAB   0    IAB   0    Ectopic   0    Molar   0    Multiple   0    Live Births   0          Obstetric Comments   FOB - Arun  Fob #1 - Pregnancy #1                Objective     Vital Signs  /63   Wt 123 kg (271 lb 12.8 oz)   LMP 2023   Estimated body mass index is 48.15 kg/m² as calculated from the following:    Height as of 23: 160 cm (63\").    Weight as of this encounter: 123 kg (271 lb 12.8 oz).    Physical Exam    Ultrasound Impression:   See viewpoint    Assessment and Plan     Liliam Romero is a 18 y.o.  20w3d who presents today for abn NIPT    Diagnoses and all orders for this visit:    1. Abnormal genetic test during pregnancy (Primary)  Assessment & Plan:  Prenatal screening for trisomy 21, trisomy 18, trisomy 13 and sex chromosome aneuploidies can be performed using next-generation sequencing of cell-free DNA (cfDNA) in the maternal circulation.  Circulating cfDNA is derived from both the mother and the fetal placenta unit and cleared from the maternal circulation soon " after delivery.  A wide range of cfDNA failure rates have been reported (0 to 10% or more, with a consensus estimate of about 2.0%) and it is difficult to explain the variability.  Although there is no standard approach to this situation, it is common practice to retest once using a cfDNA test or to switch to a standard serum screen (e.g. combined or quadruple screen). Repeat sampling is not recommended, however, in the situation of an uninformative DNA pattern.  A targeted ultrasound is another option, with invasive diagnostic testing as an option if the pregnancy was already considered to be at “high risk.”      The most common reasons for test failure include less than a specified absolute amount of total and/or fetal/placental DNA, fetal fraction below an acceptable level (e.g. <4%), and insufficient numbers of fragments sequenced and/or aligned.  Low fetal fraction may be responsible for up to 50% of all failures.   Test failure can also occur with long stretches of homozygosity.  Examples include uniparental disomy or parental consanguinity.      The concentration of fetal cfDNA falls with increasing maternal weight and is more often insufficient for prenatal screening in obese women.  Fetal cfDNA is still produced, but the fetal fraction in maternal blood is reduced because of an increased contribution of maternal cfDNA from apoptosis of adipose tissue and/or dilution of fetal (placental) DNA in the larger maternal blood volume.  Women weighing over 180 pounds (81 kg) can be informed that their chance of having a test failure or an inaccurate result is at least three or four times higher than in women of lower weight.      The patient has four options in this setting:      In the absence of an uninformative DNA pattern, repeat the cfDNA test as soon as possible, if allowed by the laboratory (some failures, such as large regions of homozygosity will always cause the test to fail and repeat testing is not an  option).  Repeat testing, when allowed, is successful in about 80% of cases.      Standard serum marker/ultrasound screening.    Detailed ultrasonographic evaluation at 19-20 weeks' gestation for ultrasound evidence of fetal aneuploidy.    Invasive procedure (amniocentesis, CVS) and diagnostic testing (karyotyping/microarray).    Patient understands the ultrasound today appears normal with no evidence of fetal trisomy.  After careful consideration the patient declines amniocentesis.    We have scheduled a repeat ultrasound at 32 weeks gestation to look for late findings of trisomy as well as to assess fetal growth.    Orders:  -     [facility]; Future    2. Morbid obesity with BMI of 45.0-49.9, adult  -     [facility] [place]; Future    3. Pregnancy, unspecified gestational age  -     [facility] [place]; Future         Follow Up  Return in about 12 weeks (around 2024).    I spent 30 minutes caring for the patient on the day of service. This included: obtaining or reviewing a separately obtained medical history, reviewing patient records, performing a medically appropriate exam and/or evaluation, counseling or educating the patient/family/caregiver, ordering medications, labs, and/or procedures and documenting such in the medical record. This does not include time spent on review and interpretation of other tests such as fetal ultrasound or the performance of other procedures such as amniocentesis or CVS.      Douglas A. Milligan, MD  Maternal Fetal Medicine, [facility] [place]     2024

## 2024-03-18 ENCOUNTER — PATIENT OUTREACH (OUTPATIENT)
Dept: LABOR AND DELIVERY | Facility: HOSPITAL | Age: 19
End: 2024-03-18
Payer: MEDICAID

## 2024-03-18 NOTE — OUTREACH NOTE
[facility]  Check-In    Current Estimated Gestational Age: 24w2d      Questions/Answers      Flowsheet Row Responses   Best Method for Contacting Home   Demographics Reviewed Yes   Currently Employed Yes   Able to keep appointments as scheduled Yes   Baby Active/Feeling Fetal Movemen Yes   How are you presently feeling? good   Are you having any of the following symptoms? Pressure  [pelvic floor pressure and occasional pain (suspect round ligament)]   Questions regarding prenatal visits or tests to be ordered? No   Supplies ready for baby Car Seat, Diapers, Feeding Supplies, Crib   Resource/Environmental Concerns Financial, Reliable Transportation   Do you have any questions related to your care experience, your pregnancy, plans for delivery, any concerns, etc? No   Other Education WIC Benefits, SNAP Benefits            Doing well. Used [employer] referral and it was helpful. Encouraged to enroll in WIC. Will f/u around 28 weeks. Encouraged to call with questions or concerns.    CHEKO Ferguson RN  Maternity Nurse Navigator    3/18/2024, 12:51 EDT

## 2024-03-19 ENCOUNTER — ROUTINE PRENATAL (OUTPATIENT)
Dept: OBSTETRICS AND GYNECOLOGY | Facility: CLINIC | Age: 19
End: 2024-03-19
Payer: MEDICAID

## 2024-03-19 VITALS — DIASTOLIC BLOOD PRESSURE: 70 MMHG | BODY MASS INDEX: 48.36 KG/M2 | WEIGHT: 273 LBS | SYSTOLIC BLOOD PRESSURE: 118 MMHG

## 2024-03-19 DIAGNOSIS — Z34.02 ENCOUNTER FOR SUPERVISION OF NORMAL FIRST PREGNANCY IN SECOND TRIMESTER: Primary | ICD-10-CM

## 2024-03-19 DIAGNOSIS — K21.9 GASTROESOPHAGEAL REFLUX DISEASE, UNSPECIFIED WHETHER ESOPHAGITIS PRESENT: ICD-10-CM

## 2024-03-19 PROCEDURE — 99213 OFFICE O/P EST LOW 20 MIN: CPT

## 2024-03-19 RX ORDER — FAMOTIDINE 20 MG/1
20 TABLET, FILM COATED ORAL 2 TIMES DAILY
Qty: 60 TABLET | Refills: 3 | Status: SHIPPED | OUTPATIENT
Start: 2024-03-19

## 2024-03-19 NOTE — PROGRESS NOTES
Chief Complaint   Patient presents with    Routine Prenatal Visit       HPI  Liliam is a  currently at 24w3d who today reports the following:  Contractions - No; Leaking - No; Vaginal bleeding -  No; Heartburn - YES. She has not been able to  any famotidine to try, so we discussed trying that. Prescription sent to  pharmacy.     Review of Systems    Objective  /70   Wt 124 kg (273 lb)   LMP 2023   BMI 48.36 kg/m²     Physical Exam    Lab Results   Component Value Date    ABO B 2023    RH Positive 2023    ABSCRN Negative 2023       Assessment and Plan  Diagnoses and all orders for this visit:    1. Encounter for supervision of normal first pregnancy in second trimester (Primary)    2. Gastroesophageal reflux disease, unspecified whether esophagitis present  -     famotidine (Pepcid) 20 MG tablet; Take 1 tablet by mouth 2 (Two) Times a Day.  Dispense: 60 tablet; Refill: 3        Continue with PNV's  Prenatal labs reviewed  OTC medical options for her heartburn/GERD  Tests scheduled today for her next visit [facility] - instructed patient on doing screening before next appointment.     Return in about 4 weeks (around 2024).    Ange Cardoso, ARSALAN  2024

## 2024-03-29 ENCOUNTER — HOSPITAL ENCOUNTER (OUTPATIENT)
Facility: HOSPITAL | Age: 19
Discharge: HOME | End: 2024-03-29
Attending: OBSTETRICS & GYNECOLOGY | Admitting: OBSTETRICS & GYNECOLOGY
Payer: MEDICAID

## 2024-03-29 VITALS
TEMPERATURE: 98.2 F | DIASTOLIC BLOOD PRESSURE: 73 MMHG | SYSTOLIC BLOOD PRESSURE: 113 MMHG | HEART RATE: 96 BPM | OXYGEN SATURATION: 96 % | RESPIRATION RATE: 20 BRPM

## 2024-03-29 LAB
BILIRUB UR QL STRIP: NEGATIVE
CLARITY UR: CLEAR
COLOR UR: YELLOW
GLUCOSE UR STRIP-MCNC: NEGATIVE MG/DL
HGB UR QL STRIP.AUTO: NEGATIVE
KETONES UR QL STRIP: ABNORMAL
LEUKOCYTE ESTERASE UR QL STRIP.AUTO: NEGATIVE
NITRITE UR QL STRIP: NEGATIVE
PH UR STRIP.AUTO: 7 [PH] (ref 5–8)
POC AMNISURE: NEGATIVE
PROT UR QL STRIP: NEGATIVE
SP GR UR STRIP: 1.03 (ref 1–1.03)
UROBILINOGEN UR QL STRIP: ABNORMAL

## 2024-03-29 PROCEDURE — 81003 URINALYSIS AUTO W/O SCOPE: CPT | Performed by: OBSTETRICS & GYNECOLOGY

## 2024-03-29 PROCEDURE — 84112 EVAL AMNIOTIC FLUID PROTEIN: CPT | Performed by: OBSTETRICS & GYNECOLOGY

## 2024-03-29 PROCEDURE — G0463 HOSPITAL OUTPT CLINIC VISIT: HCPCS

## 2024-03-29 PROCEDURE — 59025 FETAL NON-STRESS TEST: CPT

## 2024-03-29 NOTE — LETTER
March 29, 2024     Patient: Liliam Romero   YOB: 2005   Date of Visit: 3/29/2024       To Whom It May Concern:    Tara Romero was seen today 3/29/24 at [facility] and required inpatient monitoring. Her  Please excuse from work.            Sincerely,        Taryn Hamlin RN

## 2024-03-29 NOTE — NURSING NOTE
Discharge instructions reviewed with patient and support person. Advised patient to return to labor bose or call the office if there is increased blood pressure, leaking fluid or vaginal bleeding, decreased fetal movement or regular contractions. Patient verbalized understanding. Patient discharged in stable condition with all belongings.

## 2024-04-12 ENCOUNTER — TELEPHONE (OUTPATIENT)
Dept: OBSTETRICS AND GYNECOLOGY | Facility: CLINIC | Age: 19
End: 2024-04-12
Payer: MEDICAID

## 2024-04-12 NOTE — TELEPHONE ENCOUNTER
Provider: ARSALAN Parker    Caller: MERY FLOREZ    Relationship to Patient: SELF    Pharmacy:     Phone Number: [phone]    Reason for Call: GLUCOSE TEST    When was the patient last seen: 03.19.24    PATIENT CALLING IN TO SEE IF GLUCOSE TEST IS GOING TO BE DONE ON 04.16.24 WHEN SHE COMES IN FOR HER OB FOLLOW UP APPOINTMENT.    PATIENT CAN BE REACHED .[phone]    THANK YOU

## 2024-04-16 ENCOUNTER — LAB (OUTPATIENT)
Dept: LAB | Facility: HOSPITAL | Age: 19
End: 2024-04-16
Payer: MEDICAID

## 2024-04-16 ENCOUNTER — ROUTINE PRENATAL (OUTPATIENT)
Dept: OBSTETRICS AND GYNECOLOGY | Facility: CLINIC | Age: 19
End: 2024-04-16
Payer: MEDICAID

## 2024-04-16 VITALS — BODY MASS INDEX: 48.36 KG/M2 | SYSTOLIC BLOOD PRESSURE: 118 MMHG | WEIGHT: 273 LBS | DIASTOLIC BLOOD PRESSURE: 70 MMHG

## 2024-04-16 DIAGNOSIS — Z34.90 PREGNANCY, UNSPECIFIED GESTATIONAL AGE: ICD-10-CM

## 2024-04-16 DIAGNOSIS — R73.09 ELEVATED GLUCOSE TOLERANCE TEST: Primary | ICD-10-CM

## 2024-04-16 DIAGNOSIS — Z3A.28 28 WEEKS GESTATION OF PREGNANCY: Primary | ICD-10-CM

## 2024-04-16 DIAGNOSIS — O99.210 OBESITY IN PREGNANCY, ANTEPARTUM: ICD-10-CM

## 2024-04-16 LAB
GLUCOSE 1H P 100 G GLC PO SERPL-MCNC: 146 MG/DL (ref 65–139)
GLUCOSE UR STRIP-MCNC: NEGATIVE MG/DL
PROT UR STRIP-MCNC: NEGATIVE MG/DL

## 2024-04-16 PROCEDURE — 90471 IMMUNIZATION ADMIN: CPT

## 2024-04-16 PROCEDURE — 82948 REAGENT STRIP/BLOOD GLUCOSE: CPT

## 2024-04-16 PROCEDURE — 82950 GLUCOSE TEST: CPT

## 2024-04-16 PROCEDURE — 90715 TDAP VACCINE 7 YRS/> IM: CPT

## 2024-04-16 PROCEDURE — 99213 OFFICE O/P EST LOW 20 MIN: CPT

## 2024-04-16 NOTE — PROGRESS NOTES
Chief Complaint   Patient presents with    Routine Prenatal Visit       HPI  Liliam is a  currently at 28w3d who today reports the following:  Contractions - No; Leaking - No; Vaginal bleeding -  No; Heartburn - improved as compared to the prior visit.  She reports good fetal movement today.   She had her glucose test today.   She also reports spots that pop up occasionally that are slightly raised and reddened, and itches. They come and go sporadically all over her body- they disappear from one location and reappear in another location. Nobody else in the home has spots like this, and she denies bug bites or any allergic reactions. They do not appear pustule like, more flat and   Review of Systems    Objective  /70   Wt 124 kg (273 lb)   LMP 2023   BMI 48.36 kg/m²     Physical Exam    Lab Results   Component Value Date    ABO B 2023    RH Positive 2023    ABSCRN Negative 2023       Assessment and Plan  Diagnoses and all orders for this visit:    1. 28 weeks gestation of pregnancy (Primary)  -     Glucose, Post 50 Gm Glucola; Future  -     Tdap Vaccine Greater Than or Equal To 6yo IM    2. Obesity in pregnancy, antepartum        Continue with PNV's  Prenatal labs reviewed  OTC medical options for her rash/hives. Encouraged topical hydrocortisone cream for spots and to watch it and let us know if it gets worse.   Tests scheduled today for her next visit none    Return in about 2 weeks (around 2024).    Ange Cardoso, ARSALAN  2024

## 2024-04-17 ENCOUNTER — PATIENT OUTREACH (OUTPATIENT)
Dept: LABOR AND DELIVERY | Facility: HOSPITAL | Age: 19
End: 2024-04-17
Payer: MEDICAID

## 2024-04-17 NOTE — OUTREACH NOTE
[facility]  Check-In    Current Estimated Gestational Age: 28w4d      Questions/Answers      Flowsheet Row Responses   Best Method for Contacting Cell   Demographics Reviewed Yes   Currently Employed No   Able to keep appointments as scheduled Yes   Gender(s) and Name(s) Seb-Kb Pemberton   Baby Active/Feeling Fetal Movemen Yes   How are you presently feeling? good   Are you having any of the following symptoms? Other   Other Symptoms: probable round ligament pain   Questions regarding prenatal visits or tests to be ordered? Yes   Questions 3 hr GTT   Special Considerations Birth Plan   Supplies ready for baby Car Seat, Clothing, Crib, Feeding Supplies   Resource/Environmental Concerns Financial, Home Accessibility   Do you have any questions related to your care experience, your pregnancy, plans for delivery, any concerns, etc? Yes   Question diabetes in pregnancy, Birth plan   Other Education Birth Plan, Insurance benefits/Incentives, Other   Other Education Comment housing and diabetes in pregnancy            Feeling well and reports good fetal movement. Denies any concerning symptoms but discussed what to watch out for. Discussed that she still has time for childbirth education if she is interested. She has begun gathering items she will need for baby.    Unable to access [employer], provided help line number. Updated resources provided via [employer] message. Contact information provided. Encouraged to call with questions, concerns, or for support. Will plan f/u around 35 weeks to ensure she has everything she needs in place and feels ready for delivery.    CHEKO Ferguson RN  Maternity Nurse Navigator    4/17/2024, 16:05 EDT

## 2024-05-01 ENCOUNTER — ROUTINE PRENATAL (OUTPATIENT)
Dept: OBSTETRICS AND GYNECOLOGY | Facility: CLINIC | Age: 19
End: 2024-05-01
Payer: MEDICAID

## 2024-05-01 VITALS — WEIGHT: 277 LBS | BODY MASS INDEX: 49.07 KG/M2 | DIASTOLIC BLOOD PRESSURE: 70 MMHG | SYSTOLIC BLOOD PRESSURE: 128 MMHG

## 2024-05-01 DIAGNOSIS — O99.210 OBESITY IN PREGNANCY, ANTEPARTUM: ICD-10-CM

## 2024-05-01 DIAGNOSIS — O28.5 ABNORMAL GENETIC TEST DURING PREGNANCY: ICD-10-CM

## 2024-05-01 DIAGNOSIS — Z34.02 ENCOUNTER FOR SUPERVISION OF NORMAL FIRST PREGNANCY IN SECOND TRIMESTER: Primary | ICD-10-CM

## 2024-05-01 LAB
GLUCOSE UR STRIP-MCNC: NEGATIVE MG/DL
PROT UR STRIP-MCNC: NEGATIVE MG/DL

## 2024-05-01 PROCEDURE — 99213 OFFICE O/P EST LOW 20 MIN: CPT

## 2024-05-01 NOTE — PROGRESS NOTES
Chief Complaint   Patient presents with    Routine Prenatal Visit       HPI: Liliam is a  currently at 30w4d who today reports the following:  Contractions - No; Leaking - No; Vaginal bleeding -  No; Swelling of extremities - No.  She reports good fetal movement.    She has some questions today regarding birth plan and when to start discussing an induction of labor. She has not been able to get her 3 hour glucose test because of transportation issues. Discussed need for getting this as soon as possible.    ROS:  GI: Nausea - No; Constipation - No; Diarrhea - No    Neuro: Headache - No; Visual change - No    Respiratory: Cough - No; SOB - No; fever - No     EXAM:  Vitals: See prenatal flowsheet   Abdomen: See prenatal flowsheet   Urine glucose/protein: See prenatal flowsheet   Pelvic: See prenatal flowsheet   MDM:   Impression: Supervision of low risk pregnancy  GERD in pregnancy   Tests done today: none   Topics discussed: Prenatal labs reviewed  Continue with Rx prenatal vitamins and OTC antacids.  Birth plan  Symptoms of preeclampsia   Tests scheduled today for her next visit:   none

## 2024-05-14 ENCOUNTER — HOSPITAL ENCOUNTER (OUTPATIENT)
Dept: WOMENS IMAGING | Facility: HOSPITAL | Age: 19
Discharge: HOME | End: 2024-05-14
Payer: MEDICAID

## 2024-05-14 ENCOUNTER — ROUTINE PRENATAL (OUTPATIENT)
Dept: OBSTETRICS AND GYNECOLOGY | Facility: CLINIC | Age: 19
End: 2024-05-14
Payer: MEDICAID

## 2024-05-14 ENCOUNTER — OFFICE VISIT (OUTPATIENT)
Dept: OBSTETRICS AND GYNECOLOGY | Facility: HOSPITAL | Age: 19
End: 2024-05-14
Payer: MEDICAID

## 2024-05-14 ENCOUNTER — LAB (OUTPATIENT)
Dept: LAB | Facility: HOSPITAL | Age: 19
End: 2024-05-14
Payer: MEDICAID

## 2024-05-14 VITALS — WEIGHT: 278 LBS | DIASTOLIC BLOOD PRESSURE: 80 MMHG | SYSTOLIC BLOOD PRESSURE: 126 MMHG | BODY MASS INDEX: 49.25 KG/M2

## 2024-05-14 VITALS — WEIGHT: 278 LBS | BODY MASS INDEX: 49.25 KG/M2

## 2024-05-14 DIAGNOSIS — O28.5 ABNORMAL GENETIC TEST DURING PREGNANCY: Primary | ICD-10-CM

## 2024-05-14 DIAGNOSIS — E66.01 MORBID OBESITY WITH BMI OF 45.0-49.9, ADULT: ICD-10-CM

## 2024-05-14 DIAGNOSIS — O99.210 OBESITY IN PREGNANCY, ANTEPARTUM: ICD-10-CM

## 2024-05-14 DIAGNOSIS — Z34.90 PREGNANCY, UNSPECIFIED GESTATIONAL AGE: Primary | ICD-10-CM

## 2024-05-14 DIAGNOSIS — R73.09 ELEVATED GLUCOSE TOLERANCE TEST: ICD-10-CM

## 2024-05-14 DIAGNOSIS — O99.810 ABNORMAL O'SULLIVAN GLUCOSE CHALLENGE TEST, ANTEPARTUM: ICD-10-CM

## 2024-05-14 DIAGNOSIS — Z34.01 ENCOUNTER FOR SUPERVISION OF NORMAL FIRST PREGNANCY IN FIRST TRIMESTER: ICD-10-CM

## 2024-05-14 DIAGNOSIS — O28.5 ABNORMAL GENETIC TEST DURING PREGNANCY: ICD-10-CM

## 2024-05-14 DIAGNOSIS — Z3A.32 32 WEEKS GESTATION OF PREGNANCY: ICD-10-CM

## 2024-05-14 DIAGNOSIS — Z34.90 PREGNANCY, UNSPECIFIED GESTATIONAL AGE: ICD-10-CM

## 2024-05-14 DIAGNOSIS — Z34.03 ENCOUNTER FOR SUPERVISION OF NORMAL FIRST PREGNANCY IN THIRD TRIMESTER: Primary | ICD-10-CM

## 2024-05-14 LAB
GLUCOSE P FAST SERPL-MCNC: 90 MG/DL (ref 65–94)
GTT GEST 2H PNL UR+SERPL: 181 MG/DL (ref 65–179)
GTT GEST 3H PNL SERPL: 109 MG/DL (ref 65–139)
GTT GEST 3H PNL SERPL: 125 MG/DL (ref 65–154)

## 2024-05-14 PROCEDURE — 76816 OB US FOLLOW-UP PER FETUS: CPT

## 2024-05-14 PROCEDURE — 82951 GLUCOSE TOLERANCE TEST (GTT): CPT

## 2024-05-14 PROCEDURE — 82952 GTT-ADDED SAMPLES: CPT

## 2024-05-14 PROCEDURE — 82948 REAGENT STRIP/BLOOD GLUCOSE: CPT

## 2024-05-14 PROCEDURE — 99213 OFFICE O/P EST LOW 20 MIN: CPT

## 2024-05-14 PROCEDURE — 36415 COLL VENOUS BLD VENIPUNCTURE: CPT

## 2024-05-14 NOTE — PROGRESS NOTES
"Chief Complaint   Patient presents with    Routine Prenatal Visit     PDC today       HPI: Liilam is a  currently at 32w3d who today reports the following:  Contractions -  she reports feeling crampy at times ; Leaking -  she reports feeling a \"trickle\" with movement multiple times a day ; Vaginal bleeding -  No; Swelling of extremities - No.    She was seen at [facility] today for growth as well as BPP. Dr Thrasher recommended twice weekly testing from this point on- will get that scheduled today. Discussed possibility of the trickle she notices being urine; WILY today at [facility] is 16.7 cm.     ROS:  GI: Nausea - No; Constipation - No; Diarrhea - No    Neuro: Headache - No; Visual change - No    Respiratory: Cough - No; SOB - No; fever - No     EXAM:  Vitals: See prenatal flowsheet   Abdomen: See prenatal flowsheet   Urine glucose/protein: See prenatal flowsheet   Pelvic: See prenatal flowsheet   MDM:   Impression: Supervision of high risk pregnancy  Elevated 1 hour GCT without having done 3 hour yet due to transportation issues  GERD in pregnancy  Obesity in pregnancy   Tests done today: BPP 6/8  in PDC today  NST per Dr Thrasher reactive/reassuring   Topics discussed: Prenatal labs reviewed  Continue with Rx prenatal vitamins and OTC H2 blockers.   labor signs and symptoms  Reviewed kick counts   Tests scheduled today for her next visit:   NST       "

## 2024-05-14 NOTE — PROGRESS NOTES
Patient sees Dr. Kirk today. NIPT x 2 insufficient data. Glucola : 146, has not had 3-hour GTT due to work schedule. Denies complaints.

## 2024-05-15 PROBLEM — Z34.03 ENCOUNTER FOR SUPERVISION OF NORMAL FIRST PREGNANCY IN THIRD TRIMESTER: Status: ACTIVE | Noted: 2023-11-21

## 2024-05-17 ENCOUNTER — ROUTINE PRENATAL (OUTPATIENT)
Dept: OBSTETRICS AND GYNECOLOGY | Facility: CLINIC | Age: 19
End: 2024-05-17
Payer: MEDICAID

## 2024-05-17 VITALS — BODY MASS INDEX: 49.25 KG/M2 | SYSTOLIC BLOOD PRESSURE: 100 MMHG | WEIGHT: 278 LBS | DIASTOLIC BLOOD PRESSURE: 60 MMHG

## 2024-05-17 DIAGNOSIS — O28.8 NON-REACTIVE NST (NON-STRESS TEST): ICD-10-CM

## 2024-05-17 DIAGNOSIS — O09.93 HIGH-RISK PREGNANCY IN THIRD TRIMESTER: Primary | ICD-10-CM

## 2024-05-17 DIAGNOSIS — O99.210 OBESITY IN PREGNANCY, ANTEPARTUM: ICD-10-CM

## 2024-05-17 DIAGNOSIS — O99.810 ABNORMAL O'SULLIVAN GLUCOSE CHALLENGE TEST, ANTEPARTUM: ICD-10-CM

## 2024-05-17 LAB
GLUCOSE UR STRIP-MCNC: NEGATIVE MG/DL
PROT UR STRIP-MCNC: NEGATIVE MG/DL

## 2024-05-17 NOTE — PROGRESS NOTES
Chief Complaint   Patient presents with    Routine Prenatal Visit     NST started at 0815 / had what felt like period cramps every 10 minutes last night that was going into her back and her abdomen was getting as hard as her forehead       HPI: Liliam is a  currently at 32w6d who today reports the following:  Contractions - YES - but less than 4/hour AND no associated change in vaginal discharge; Leaking - No; Vaginal bleeding -  No; Swelling of extremities - No.    ROS:  GI: Nausea - No; Constipation - No; Diarrhea - No    Neuro: Headache - No; Visual change - No      EXAM:  Vitals: See prenatal flowsheet   Abdomen: See prenatal flowsheet   Urine glucose/protein: See prenatal flowsheet   Pelvic: See prenatal flowsheet    Cervix unchanged over one hour /-3   MDM:   Impression: Supervision of high risk pregnancy  Elevated 1 hour   Obesity in pregnancy  No evidence of active labor   Tests done today: NST - reactive  BPP    Topics discussed: Continue with PNV's  Prenatal labs reviewed   labor signs and symptoms  Symptoms of preeclampsia  Continue twice weekly NSTs per MFM   Tests scheduled today for her next visit:   NST     Non Stress Test      Patient: Liliam Romero  : 2005  MRN: [id number]  CSN: [id number]  Date: 2024    Estimated Date of Delivery: 24  Gestational Age: 32w6d    Indication for NST Obesity in pregnancy   Total time > 1 hour                   Interpretation    Baseline  beats per minute   Accelerations  Present   Decelerations Absent       Additional Comments Reactive and reassuring       Recommendations for f/u See above        This note has been electronically signed.    Belinda Kirk MD

## 2024-05-20 NOTE — PROGRESS NOTES
Patient seen in Maternal Fetal Medicine clinic today. Please see full note in under imaging tab of patient chart in Epic (Viewpoint report).    Becca Thrasher MD

## 2024-05-21 ENCOUNTER — ROUTINE PRENATAL (OUTPATIENT)
Dept: OBSTETRICS AND GYNECOLOGY | Facility: CLINIC | Age: 19
End: 2024-05-21
Payer: MEDICAID

## 2024-05-21 VITALS — SYSTOLIC BLOOD PRESSURE: 120 MMHG | BODY MASS INDEX: 49.42 KG/M2 | WEIGHT: 279 LBS | DIASTOLIC BLOOD PRESSURE: 80 MMHG

## 2024-05-21 DIAGNOSIS — O99.810 ABNORMAL O'SULLIVAN GLUCOSE CHALLENGE TEST, ANTEPARTUM: ICD-10-CM

## 2024-05-21 DIAGNOSIS — O99.210 OBESITY IN PREGNANCY, ANTEPARTUM: ICD-10-CM

## 2024-05-21 DIAGNOSIS — Z3A.33 33 WEEKS GESTATION OF PREGNANCY: Primary | ICD-10-CM

## 2024-05-21 DIAGNOSIS — R73.09 ELEVATED GLUCOSE TOLERANCE TEST: ICD-10-CM

## 2024-05-21 DIAGNOSIS — Z34.03 ENCOUNTER FOR SUPERVISION OF NORMAL FIRST PREGNANCY IN THIRD TRIMESTER: ICD-10-CM

## 2024-05-21 LAB
BACTERIA UR QL AUTO: ABNORMAL /HPF
BILIRUB UR QL STRIP: NEGATIVE
CLARITY UR: ABNORMAL
COLOR UR: YELLOW
GLUCOSE UR STRIP-MCNC: NEGATIVE MG/DL
HGB UR QL STRIP.AUTO: NEGATIVE
HOLD SPECIMEN: NORMAL
HYALINE CASTS UR QL AUTO: ABNORMAL /LPF
KETONES UR QL STRIP: NEGATIVE
LEUKOCYTE ESTERASE UR QL STRIP.AUTO: ABNORMAL
NITRITE UR QL STRIP: NEGATIVE
PH UR STRIP.AUTO: 7 [PH] (ref 5–8)
PROT UR QL STRIP: ABNORMAL
RBC # UR STRIP: ABNORMAL /HPF
REF LAB TEST METHOD: ABNORMAL
SP GR UR STRIP: 1.02 (ref 1–1.03)
SQUAMOUS #/AREA URNS HPF: ABNORMAL /HPF
UROBILINOGEN UR QL STRIP: ABNORMAL
WBC # UR STRIP: ABNORMAL /HPF

## 2024-05-21 PROCEDURE — 87086 URINE CULTURE/COLONY COUNT: CPT | Performed by: NURSE PRACTITIONER

## 2024-05-21 PROCEDURE — 81001 URINALYSIS AUTO W/SCOPE: CPT | Performed by: NURSE PRACTITIONER

## 2024-05-21 NOTE — PROGRESS NOTES
Chief Complaint   Patient presents with    Routine Prenatal Visit     US done today / NST started at 1430       HPI: Liliam is a  currently at 33w3d who today reports the following: She reported decreased fetal movement to the nurse however when questioned about fetal movements reports baby was moving normally up until the time she got here she just was not sure if she had felt normal movement since she has been in office.  She completed her GTT 2024  and passed her testing.  When discussing fetal movement she reports she has not had anything to eat or drink today.  Patient given snack and water.  States good fetal movement while on NST.  Contractions - YES - mild ; Leaking - No; Vaginal bleeding -  No; Swelling of extremities - No.    ROS:  GI: Nausea - No; Constipation - No; Diarrhea - No    Neuro: Headache - No; Visual change - No      EXAM:  Vitals: See prenatal flowsheet   Abdomen: See prenatal flowsheet   Urine glucose/protein: See prenatal flowsheet   Pelvic: See prenatal flowsheet  Speculum exam performed and cervix visually unchanged.   MDM:   Impression: Supervision of high risk pregnancy  Elevated 1 hour   Obesity in pregnancy   Tests done today: BPP -  (-2) fetal  breathing.  Reactive NST for overall 8 of 10 normal WILY  ua   Topics discussed: Continue with PNV's  Prenatal labs reviewed   labor signs and symptoms  Symptoms of preeclampsia  Kick counts reviewed  Discussed need for increased water and hydration.  Will notify patient if urine shows UTI needing antibiotic.  Continue twice weekly  testing.   Tests scheduled today for her next visit:   NST      Non Stress Test      Patient: Liliam Romero  : 2005  MRN: [id number]  CSN: [id number]  Date: 2024    Estimated Date of Delivery: 24  Gestational Age: 33w3d    Indication for NST abnormal BPP       Total Time on NST > 20 minutes       Interpretation    Baseline  beats per minute   Category 1    Decelerations Variable: mild       Additional Comments none       This note has been electronically signed.    Marilyn Clark CNM  5/21/2024  16:40 EDT

## 2024-05-23 ENCOUNTER — HOSPITAL ENCOUNTER (OUTPATIENT)
Facility: HOSPITAL | Age: 19
Discharge: HOME | End: 2024-05-23
Attending: OBSTETRICS & GYNECOLOGY | Admitting: OBSTETRICS & GYNECOLOGY
Payer: MEDICAID

## 2024-05-23 ENCOUNTER — TELEPHONE (OUTPATIENT)
Dept: OBSTETRICS AND GYNECOLOGY | Facility: CLINIC | Age: 19
End: 2024-05-23
Payer: MEDICAID

## 2024-05-23 VITALS
TEMPERATURE: 98.1 F | HEIGHT: 62 IN | RESPIRATION RATE: 16 BRPM | SYSTOLIC BLOOD PRESSURE: 117 MMHG | DIASTOLIC BLOOD PRESSURE: 81 MMHG | HEART RATE: 91 BPM | BODY MASS INDEX: 51.03 KG/M2 | OXYGEN SATURATION: 96 %

## 2024-05-23 LAB — BACTERIA SPEC AEROBE CULT: NORMAL

## 2024-05-23 PROCEDURE — 59025 FETAL NON-STRESS TEST: CPT

## 2024-05-23 PROCEDURE — 59025 FETAL NON-STRESS TEST: CPT | Performed by: OBSTETRICS & GYNECOLOGY

## 2024-05-23 PROCEDURE — 99213 OFFICE O/P EST LOW 20 MIN: CPT | Performed by: OBSTETRICS & GYNECOLOGY

## 2024-05-23 PROCEDURE — G0463 HOSPITAL OUTPT CLINIC VISIT: HCPCS

## 2024-05-23 NOTE — H&P
"[facility]  Obstetric History and Physical    Chief Complaint   Patient presents with    Decreased Fetal Movement     HPI:      Patient is a 19 y.o. female  currently at 33w5d, who presents with decreased fetal movement.   She say she had not felt movement in the last 6 hours.   She has regular BPPs in the office, but was not sure why they do them.   She denies vaginal leaking and bleeding.  She felt movement after the monitor straps were in place.  No other complaints.          The following portions of the patients history were reviewed and updated as appropriate: current medications, allergies, past medical history, past surgical history, past family history, past social history and problem list .       Prenatal Information:   Maternal Prenatal Labs  Blood Type No results found for: \"ABO\"   Rh Status No results found for: \"RH\"   Antibody Screen No results found for: \"ABSCRN\"   Gonnorhea No results found for: \"GCCX\"   Chlamydia No results found for: \"CLAMYDCU\"   RPR No results found for: \"RPR\"   Syphilis Antibody No results found for: \"SYPHILIS\"   Rubella No results found for: \"RUBELLAIGGIN\"   Hepatitis B Surface Antigen No results found for: \"HEPBSAG\"   HIV-1 Antibody No results found for: \"LABHIV1\"   Hepatitis C Antibody No results found for: \"HEPCAB\"   Rapid Urin Drug Screen No results found for: \"AMPMETHU\", \"BARBITSCNUR\", \"LABBENZSCN\", \"LABMETHSCN\", \"LABOPIASCN\", \"THCURSCR\", \"COCAINEUR\", \"AMPHETSCREEN\", \"PROPOXSCN\", \"BUPRENORSCNU\", \"METAMPSCNUR\", \"OXYCODONESCN\", \"TRICYCLICSCN\"   Group B Strep Culture No results found for: \"GBSANTIGEN\"           External Prenatal Results       Pregnancy Outside Results - Transcribed From Office Records - See Scanned Records For Details       Test Value Date Time    ABO  B  23 1625    Rh  Positive  23 1625    Antibody Screen  Negative  23 1625    Varicella IgG       Rubella  6.67 index 23 1625    Hgb  13.2 g/dL 23 1625       14.5 g/dL " 23 2319    Hct  40.7 % 23 1625       45.3 % 23 2319    Glucose Fasting GTT  90 mg/dL 24 1238    Glucose Tolerance Test 1 hour  181 mg/dL 24 1238    Glucose Tolerance Test 3 hour  109 mg/dL 24 0354    Gonorrhea (discrete) ^ negative  23     Chlamydia (discrete) ^ negative  23     RPR  Non-Reactive  23 1625    VDRL       Syphilis Antibody       HBsAg  Non-Reactive  23 1625    Herpes Simplex Virus PCR       Herpes Simplex VIrus Culture       HIV  Non-Reactive  23 1625    Hep C RNA Quant PCR       Hep C Antibody  Non-Reactive  23 1625    AFP       Group B Strep       GBS Susceptibility to Clindamycin       GBS Susceptibility to Erythromycin       Fetal Fibronectin       Genetic Testing, Maternal Blood                 Drug Screening       Test Value Date Time    NIPT        Urine Drug Screen       Amphetamine Screen  Negative  23 1520    Barbiturate Screen  Negative  23 1520    Benzodiazepine Screen  Negative  23 1520    Methadone Screen  Negative  23 1520    Phencyclidine Screen  Negative  23 1520    Opiates Screen  Negative  23 1520    THC Screen  Positive  23 1520    Cocaine Screen       Propoxyphene Screen       Buprenorphine Screen  Negative  23 1520    Methamphetamine Screen       Oxycodone Screen  Negative  23 1520              Legend    ^: Historical                              Past OB History:     OB History    Para Term  AB Living   1 0 0 0 0 0   SAB IAB Ectopic Molar Multiple Live Births   0 0 0 0 0 0      # Outcome Date GA Lbr Barney/2nd Weight Sex Type Anes PTL Lv   1 Current               Obstetric Comments   FOB - Arun   Fob #1 - Pregnancy #1        Past Medical History: Past Medical History:   Diagnosis Date    ADHD     Anxiety     Depression     OCD (obsessive compulsive disorder)     PTSD (post-traumatic stress disorder)     Skin rash     PERSISTENT    Trauma        Past Surgical History No past surgical history on file.   Family History: Family History   Problem Relation Age of Onset    Ovarian cancer Mother     Ovarian cancer Maternal Grandmother     Breast cancer Neg Hx     Colon cancer Neg Hx     Osteoporosis Neg Hx       Social History:  reports that she quit smoking about 2 years ago. Her smoking use included cigarettes. She has been exposed to tobacco smoke. She has never used smokeless tobacco.   reports no history of alcohol use.   reports current drug use. Drug: Marijuana.        Review of Systems  Denies fever, HA, CP, Shortness of air, muscle weakness,  and rashes      Objective     Vital Signs Range for the last 24 hours  Temperature: Temp:  [98.1 °F (36.7 °C)] 98.1 °F (36.7 °C)   Temp Source: Temp src: Oral   BP: BP: (117-128)/(81-88) 117/81   Pulse: Heart Rate:  [91-98] 91   Respirations: Resp:  [16] 16   SPO2: SpO2:  [96 %] 96 %   O2 Amount (l/min):     O2 Devices     Weight:       Physical Examination: General appearance - alert, well appearing, and in no distress and oriented to person, place, and time  Chest - Breathing is unlaboured   Heart - regular rate.   Abdomen - soft, nontender, nondistended,   no rebound tenderness noted  No guarding, No RUQ pain  Extremities - pedal edema +1      Fetal Heart Rate Assessment   Method: Fetal HR Assessment Method: external   Beats/min: Fetal HR (beats/min): 145   Baseline: Fetal HR Baseline: normal range   Varibility: Fetal HR Variability: moderate (amplitude range 6 to 25 bpm)   Accels: Fetal HR Accelerations: greater than/equal to 15 bpm, lasting at least 15 seconds   Decels: Fetal HR Decelerations: absent   Tracing Category:       Uterine Assessment   Method: Method: external tocotransducer   Frequency (min): Contraction Frequency (Minutes): x1   Ctx Count in 10 min:     Duration:     Intensity:     Intensity by IUPC:     Resting Tone: Uterine Resting Tone: soft by palpation   Resting Tone by IUPC:     Mary  Units:      NST                     Indication Decreased fetal movement   Start time: 0225                  End time: 0245  15 x 15 accels x 2  Yes  No decels  Baseline  140s   Reactive NST - Yes         Assessment/Plan  1. Intrauterine pregnancy at 33w5d weeks gestation with reactive fetal status  2.  Decreased fetal movement  - resolved with RNST and movement felt and heard on the monitors   3.  Given educations and reassurance  4. Questions answered  5. D/C home.       Karl Rosas MD  5/23/2024  03:08 EDT

## 2024-05-23 NOTE — TELEPHONE ENCOUNTER
Called and spoke with patient and informed of provider advisement in full, she verified understanding.  She stated that she didn't know if she'd have enough gas in her car to get to  and back for tomorrow's appointment.  I spoke with Matilde ADLER, who confirmed that we have gas cards that we could provider for her at her appt tomorrow, they would be set aside for patient with her name on it.  Called patient back and informed her of info re: gas cards, she was appreciative and confirmed appt tomorrow.

## 2024-05-23 NOTE — TELEPHONE ENCOUNTER
Provider: DR BAYLON    Caller: MERY FLOREZ    Relationship to Patient: SELF     Pharmacy: [facility]    Phone Number: [phone]    Reason for Call: PT WAS SEEN IN L&D THIS MORNING FOR DECREASED FETAL MOVEMENT SHE HAD NOT FELT THE BABY MOVE FOR 6 HRS  SHE IS ALSO CRAMPY - SHE HAD A NST DONE AND THEY MONITORED HER FOR A WHILE AND SHE WAS RELEASED - PT WAS TOLD TO INFORM HER PROVIDER - PT WOULD LIKE TO ADVISED HER BABY IS STILL NOT MOVING MUCH

## 2024-05-23 NOTE — TELEPHONE ENCOUNTER
It looks like NST was reactive and reassuring, so she should just keep her appointment for tomorrow. It is normal to not feel constant movement, but when she gets concerned she should do a kick count session. Please remind patient to have a snack and drink, and then reiterate education on kick counts. Please route back if she has further concerns.

## 2024-05-24 ENCOUNTER — ROUTINE PRENATAL (OUTPATIENT)
Dept: OBSTETRICS AND GYNECOLOGY | Facility: CLINIC | Age: 19
End: 2024-05-24
Payer: MEDICAID

## 2024-05-24 VITALS — BODY MASS INDEX: 52.13 KG/M2 | SYSTOLIC BLOOD PRESSURE: 120 MMHG | DIASTOLIC BLOOD PRESSURE: 80 MMHG | WEIGHT: 285 LBS

## 2024-05-24 DIAGNOSIS — Z34.03 ENCOUNTER FOR SUPERVISION OF NORMAL FIRST PREGNANCY IN THIRD TRIMESTER: Primary | ICD-10-CM

## 2024-05-24 DIAGNOSIS — O99.810 ABNORMAL O'SULLIVAN GLUCOSE CHALLENGE TEST, ANTEPARTUM: ICD-10-CM

## 2024-05-24 DIAGNOSIS — O99.210 OBESITY IN PREGNANCY, ANTEPARTUM: ICD-10-CM

## 2024-05-24 DIAGNOSIS — O28.8 NON-REACTIVE NST (NON-STRESS TEST): ICD-10-CM

## 2024-05-24 LAB
GLUCOSE UR STRIP-MCNC: NEGATIVE MG/DL
PROT UR STRIP-MCNC: NEGATIVE MG/DL

## 2024-05-24 NOTE — PROGRESS NOTES
.  Chief Complaint   Patient presents with    Routine Prenatal Visit     Was seen in L&D on  / NST started at 0826       HPI: Liliam is a  currently at 33w6d who today reports the following:  Contractions - No; Leaking - No; Vaginal bleeding -  No; Swelling of extremities - No.    She went to labor bose on 24 for decreased FM. She reports movements have been better all week. She is trying to take in more protein.     ROS:  GI: Nausea - No; Constipation - No; Diarrhea - No    Neuro: Headache - No; Visual change - No      EXAM:  Vitals: See prenatal flowsheet   Abdomen: See prenatal flowsheet   Urine glucose/protein: See prenatal flowsheet   Pelvic: See prenatal flowsheet   MDM:   Impression: Supervision of high risk pregnancy  Elevated 1 hour - normal 3 hour GTT  Obesity in pregnancy (BMI 45)  One mild range BP with repeat normotensive, patient asymptomatic and urine protein negative    Tests done today: NST - nonreactive   Topics discussed: Continue with PNV's  Prenatal labs reviewed   labor signs and symptoms  Symptoms of preeclampsia  Reviewed fetal movement counts    Tests scheduled today for her next visit:   U/S for BPP - / (total today 8/10 - 2 off for NST)      Non Stress Test      Patient: Liliam Romero  : 2005  MRN: [id number]  CSN: [id number]  Date: 2024    Estimated Date of Delivery: 24  Gestational Age: 33w6d    Indication for NST Morbid obesity   Total time > 1 hour                   Interpretation    Baseline  beats per minute   Accelerations  Borderline 15 x 15   Decelerations Questionable VD versus coming off monitor - reassuring for 1 hour after without decelerations        Additional Comments Non-reactive       Recommendations for f/u See above       This note has been electronically signed.    Belinda Kirk MD

## 2024-05-28 ENCOUNTER — ROUTINE PRENATAL (OUTPATIENT)
Dept: OBSTETRICS AND GYNECOLOGY | Facility: CLINIC | Age: 19
End: 2024-05-28
Payer: MEDICAID

## 2024-05-28 VITALS — DIASTOLIC BLOOD PRESSURE: 88 MMHG | WEIGHT: 284 LBS | BODY MASS INDEX: 51.94 KG/M2 | SYSTOLIC BLOOD PRESSURE: 130 MMHG

## 2024-05-28 DIAGNOSIS — Z34.03 ENCOUNTER FOR SUPERVISION OF NORMAL FIRST PREGNANCY IN THIRD TRIMESTER: ICD-10-CM

## 2024-05-28 DIAGNOSIS — Z3A.34 34 WEEKS GESTATION OF PREGNANCY: Primary | ICD-10-CM

## 2024-05-28 DIAGNOSIS — O99.810 ABNORMAL O'SULLIVAN GLUCOSE CHALLENGE TEST, ANTEPARTUM: ICD-10-CM

## 2024-05-28 DIAGNOSIS — O99.210 OBESITY IN PREGNANCY, ANTEPARTUM: ICD-10-CM

## 2024-05-28 NOTE — PROGRESS NOTES
Chief Complaint   Patient presents with    Routine Prenatal Visit     Ob follow up       HPI: Lilaim is a  currently at 34w3d who today reports the following: She reports good fetal movement.   Contractions - No; Leaking - No; Vaginal bleeding -  No; Swelling of extremities - YES. Ble 1+    ROS:  GI: Nausea - No; Constipation - No; Diarrhea - No    Neuro: Headache -  on occasion, only after sic ; Visual change - No      EXAM:  Vitals: See prenatal flowsheet   Abdomen: See prenatal flowsheet   Urine glucose/protein: See prenatal flowsheet   Pelvic: See prenatal flowsheet   MDM:   Impression: Supervision of high risk pregnancy  Elevated 1 hour - normal 3 hour GTT  Obesity in pregnancy (BMI 45)   Tests done today: BPP -  ashlyn 17.5cm   Topics discussed: Continue with PNV's  Prenatal labs reviewed   labor signs and symptoms  Symptoms of preeclampsia  Kick counts reviewed    Tests scheduled today for her next visit:   JEY

## 2024-05-31 ENCOUNTER — HOSPITAL ENCOUNTER (OUTPATIENT)
Facility: HOSPITAL | Age: 19
Setting detail: OBSERVATION
Discharge: HOME | End: 2024-06-01
Attending: OBSTETRICS & GYNECOLOGY | Admitting: OBSTETRICS & GYNECOLOGY
Payer: MEDICAID

## 2024-05-31 ENCOUNTER — PREP FOR SURGERY (OUTPATIENT)
Dept: OTHER | Facility: HOSPITAL | Age: 19
End: 2024-05-31
Payer: MEDICAID

## 2024-05-31 ENCOUNTER — ROUTINE PRENATAL (OUTPATIENT)
Dept: OBSTETRICS AND GYNECOLOGY | Facility: CLINIC | Age: 19
End: 2024-05-31
Payer: MEDICAID

## 2024-05-31 VITALS — DIASTOLIC BLOOD PRESSURE: 90 MMHG | WEIGHT: 284 LBS | BODY MASS INDEX: 51.94 KG/M2 | SYSTOLIC BLOOD PRESSURE: 118 MMHG

## 2024-05-31 DIAGNOSIS — O99.210 OBESITY IN PREGNANCY, ANTEPARTUM: ICD-10-CM

## 2024-05-31 DIAGNOSIS — Z34.03 ENCOUNTER FOR SUPERVISION OF NORMAL FIRST PREGNANCY IN THIRD TRIMESTER: Primary | ICD-10-CM

## 2024-05-31 DIAGNOSIS — O13.3 GESTATIONAL HYPERTENSION WITHOUT SIGNIFICANT PROTEINURIA IN THIRD TRIMESTER: Primary | ICD-10-CM

## 2024-05-31 DIAGNOSIS — R03.0 ELEVATED BLOOD PRESSURE READING IN OFFICE WITHOUT DIAGNOSIS OF HYPERTENSION: ICD-10-CM

## 2024-05-31 DIAGNOSIS — O99.810 ABNORMAL O'SULLIVAN GLUCOSE CHALLENGE TEST, ANTEPARTUM: ICD-10-CM

## 2024-05-31 DIAGNOSIS — O13.3 GESTATIONAL HYPERTENSION WITHOUT SIGNIFICANT PROTEINURIA IN THIRD TRIMESTER: ICD-10-CM

## 2024-05-31 LAB
ABO GROUP BLD: NORMAL
ALBUMIN SERPL-MCNC: 3.3 G/DL (ref 3.5–5.2)
ALBUMIN/GLOB SERPL: 1.1 G/DL
ALP SERPL-CCNC: 91 U/L (ref 39–117)
ALT SERPL W P-5'-P-CCNC: 9 U/L (ref 1–33)
ANION GAP SERPL CALCULATED.3IONS-SCNC: 11 MMOL/L (ref 5–15)
AST SERPL-CCNC: 8 U/L (ref 1–32)
BILIRUB SERPL-MCNC: <0.2 MG/DL (ref 0–1.2)
BLD GP AB SCN SERPL QL: NEGATIVE
BUN SERPL-MCNC: 8 MG/DL (ref 6–20)
BUN/CREAT SERPL: 12.1 (ref 7–25)
CALCIUM SPEC-SCNC: 9.5 MG/DL (ref 8.6–10.5)
CHLORIDE SERPL-SCNC: 106 MMOL/L (ref 98–107)
CO2 SERPL-SCNC: 23 MMOL/L (ref 22–29)
CREAT SERPL-MCNC: 0.66 MG/DL (ref 0.57–1)
DEPRECATED RDW RBC AUTO: 41.3 FL (ref 37–54)
EGFRCR SERPLBLD CKD-EPI 2021: 128.2 ML/MIN/1.73
ERYTHROCYTE [DISTWIDTH] IN BLOOD BY AUTOMATED COUNT: 13.2 % (ref 12.3–15.4)
EXPIRATION DATE: NORMAL
GLOBULIN UR ELPH-MCNC: 2.9 GM/DL
GLUCOSE SERPL-MCNC: 82 MG/DL (ref 65–99)
GLUCOSE UR STRIP-MCNC: NEGATIVE MG/DL
HCT VFR BLD AUTO: 39 % (ref 34–46.6)
HGB BLD-MCNC: 13 G/DL (ref 12–15.9)
LDH SERPL-CCNC: 203 U/L (ref 135–214)
Lab: NORMAL
MCH RBC QN AUTO: 29 PG (ref 26.6–33)
MCHC RBC AUTO-ENTMCNC: 33.3 G/DL (ref 31.5–35.7)
MCV RBC AUTO: 87.1 FL (ref 79–97)
PLATELET # BLD AUTO: 264 10*3/MM3 (ref 140–450)
PMV BLD AUTO: 12.2 FL (ref 6–12)
POTASSIUM SERPL-SCNC: 4.9 MMOL/L (ref 3.5–5.2)
PROT SERPL-MCNC: 6.2 G/DL (ref 6–8.5)
PROT UR STRIP-MCNC: ABNORMAL MG/DL
PROT UR STRIP-MCNC: NEGATIVE MG/DL
RBC # BLD AUTO: 4.48 10*6/MM3 (ref 3.77–5.28)
RH BLD: POSITIVE
SODIUM SERPL-SCNC: 140 MMOL/L (ref 136–145)
T PALLIDUM IGG SER QL: NORMAL
T&S EXPIRATION DATE: NORMAL
URATE SERPL-MCNC: 5.6 MG/DL (ref 2.4–5.7)
WBC NRBC COR # BLD AUTO: 12.8 10*3/MM3 (ref 3.4–10.8)

## 2024-05-31 PROCEDURE — G0378 HOSPITAL OBSERVATION PER HR: HCPCS

## 2024-05-31 PROCEDURE — 84550 ASSAY OF BLOOD/URIC ACID: CPT | Performed by: OBSTETRICS & GYNECOLOGY

## 2024-05-31 PROCEDURE — 81002 URINALYSIS NONAUTO W/O SCOPE: CPT | Performed by: OBSTETRICS & GYNECOLOGY

## 2024-05-31 PROCEDURE — G0379 DIRECT REFER HOSPITAL OBSERV: HCPCS

## 2024-05-31 PROCEDURE — 86850 RBC ANTIBODY SCREEN: CPT | Performed by: OBSTETRICS & GYNECOLOGY

## 2024-05-31 PROCEDURE — 86901 BLOOD TYPING SEROLOGIC RH(D): CPT | Performed by: OBSTETRICS & GYNECOLOGY

## 2024-05-31 PROCEDURE — 85027 COMPLETE CBC AUTOMATED: CPT | Performed by: OBSTETRICS & GYNECOLOGY

## 2024-05-31 PROCEDURE — 86780 TREPONEMA PALLIDUM: CPT | Performed by: OBSTETRICS & GYNECOLOGY

## 2024-05-31 PROCEDURE — 59025 FETAL NON-STRESS TEST: CPT

## 2024-05-31 PROCEDURE — 86900 BLOOD TYPING SEROLOGIC ABO: CPT | Performed by: OBSTETRICS & GYNECOLOGY

## 2024-05-31 PROCEDURE — 80053 COMPREHEN METABOLIC PANEL: CPT | Performed by: OBSTETRICS & GYNECOLOGY

## 2024-05-31 PROCEDURE — 83615 LACTATE (LD) (LDH) ENZYME: CPT | Performed by: OBSTETRICS & GYNECOLOGY

## 2024-05-31 RX ORDER — CALCIUM CARBONATE 500 MG/1
1 TABLET, CHEWABLE ORAL DAILY PRN
Status: CANCELLED | OUTPATIENT
Start: 2024-05-31

## 2024-05-31 RX ORDER — ONDANSETRON 4 MG/1
8 TABLET, ORALLY DISINTEGRATING ORAL EVERY 8 HOURS PRN
Status: CANCELLED | OUTPATIENT
Start: 2024-05-31

## 2024-05-31 RX ORDER — CALCIUM CARBONATE 500 MG/1
1 TABLET, CHEWABLE ORAL DAILY PRN
Status: DISCONTINUED | OUTPATIENT
Start: 2024-05-31 | End: 2024-06-01 | Stop reason: HOSPADM

## 2024-05-31 RX ORDER — DOCUSATE SODIUM 100 MG/1
100 CAPSULE, LIQUID FILLED ORAL 2 TIMES DAILY PRN
Status: CANCELLED | OUTPATIENT
Start: 2024-05-31

## 2024-05-31 RX ORDER — LIDOCAINE HYDROCHLORIDE 10 MG/ML
0.5 INJECTION, SOLUTION EPIDURAL; INFILTRATION; INTRACAUDAL; PERINEURAL ONCE AS NEEDED
Status: DISCONTINUED | OUTPATIENT
Start: 2024-05-31 | End: 2024-06-01 | Stop reason: HOSPADM

## 2024-05-31 RX ORDER — BISACODYL 10 MG
10 SUPPOSITORY, RECTAL RECTAL DAILY PRN
Status: CANCELLED | OUTPATIENT
Start: 2024-05-31

## 2024-05-31 RX ORDER — DOCUSATE SODIUM 100 MG/1
100 CAPSULE, LIQUID FILLED ORAL 2 TIMES DAILY PRN
Status: DISCONTINUED | OUTPATIENT
Start: 2024-05-31 | End: 2024-06-01 | Stop reason: HOSPADM

## 2024-05-31 RX ORDER — ACETAMINOPHEN 325 MG/1
650 TABLET ORAL EVERY 4 HOURS PRN
Status: DISCONTINUED | OUTPATIENT
Start: 2024-05-31 | End: 2024-06-01 | Stop reason: HOSPADM

## 2024-05-31 RX ORDER — ONDANSETRON 2 MG/ML
4 INJECTION INTRAMUSCULAR; INTRAVENOUS EVERY 8 HOURS PRN
Status: CANCELLED | OUTPATIENT
Start: 2024-05-31

## 2024-05-31 RX ORDER — ACETAMINOPHEN 325 MG/1
650 TABLET ORAL EVERY 4 HOURS PRN
Status: CANCELLED | OUTPATIENT
Start: 2024-05-31

## 2024-05-31 RX ORDER — BISACODYL 10 MG
10 SUPPOSITORY, RECTAL RECTAL DAILY PRN
Status: DISCONTINUED | OUTPATIENT
Start: 2024-05-31 | End: 2024-06-01 | Stop reason: HOSPADM

## 2024-05-31 RX ORDER — LIDOCAINE HYDROCHLORIDE 10 MG/ML
0.5 INJECTION, SOLUTION EPIDURAL; INFILTRATION; INTRACAUDAL; PERINEURAL ONCE AS NEEDED
Status: CANCELLED | OUTPATIENT
Start: 2024-05-31

## 2024-05-31 RX ORDER — ONDANSETRON 4 MG/1
8 TABLET, ORALLY DISINTEGRATING ORAL EVERY 8 HOURS PRN
Status: DISCONTINUED | OUTPATIENT
Start: 2024-05-31 | End: 2024-06-01 | Stop reason: HOSPADM

## 2024-05-31 RX ORDER — ONDANSETRON 2 MG/ML
4 INJECTION INTRAMUSCULAR; INTRAVENOUS EVERY 8 HOURS PRN
Status: DISCONTINUED | OUTPATIENT
Start: 2024-05-31 | End: 2024-06-01 | Stop reason: HOSPADM

## 2024-05-31 NOTE — H&P (VIEW-ONLY)
Liliam Romero  : 2005  MRN: [id number]  CSN: [id number]    History and Physical  CC: Elevated BP in the office   Subjective   Liliam Romero is a 19 y.o. year old  with an Estimated Date of Delivery: 24 currently at 34w6d presented office for routine prenatal care and  testing in the setting of morbid obesity with a BMI 51.  She had a blood pressure reading that was mild range with repeats remaining mild range.  She reports overall feeling well with good fetal movements but has noticed some lower extremity swelling.  She denies any new headaches visual changes right upper quadrant pain or new shortness of breath.    Prenatal care has been with Dr. Kirk.  It has been complicated by morbid obesity, elevated 1 hour GCT - normal 3 hour GTT .    OB History    Para Term  AB Living   1 0 0 0 0 0   SAB IAB Ectopic Molar Multiple Live Births   0 0 0 0 0 0      # Outcome Date GA Lbr Barney/2nd Weight Sex Type Anes PTL Lv   1 Current               Obstetric Comments   FOB - Arun   Fob #1 - Pregnancy #1      Past Medical History:   Diagnosis Date    ADHD     Anxiety     Depression     OCD (obsessive compulsive disorder)     PTSD (post-traumatic stress disorder)     Skin rash     PERSISTENT    Trauma      No past surgical history on file.    Current Outpatient Medications:     docusate sodium (Colace) 100 MG capsule, Take 1 capsule by mouth 2 (Two) Times a Day As Needed for Constipation., Disp: 60 capsule, Rfl: 1    famotidine (Pepcid) 20 MG tablet, Take 1 tablet by mouth 2 (Two) Times a Day., Disp: 60 tablet, Rfl: 3    Prenatal Vit-Fe Fumarate-FA (prenatal vitamin 27-0.8) 27-0.8 MG tablet tablet, Take  by mouth Daily., Disp: , Rfl:     No Known Allergies  Social History    Tobacco Use      Smoking status: Former        Packs/day: 0.00        Types: Cigarettes        Quit date:         Years since quittin.4        Passive exposure: Past      Smokeless tobacco:  Never      Tobacco comments: Vaping     Review of Systems   Constitutional:  Negative for chills and fever.   HENT:  Negative for congestion and sore throat.    Respiratory:  Negative for shortness of breath and wheezing.    Cardiovascular:  Negative for chest pain and palpitations.   Gastrointestinal:  Negative for abdominal pain, nausea and vomiting.   Genitourinary:  Negative for frequency, vaginal bleeding and vaginal pain.   Musculoskeletal:  Negative for back pain and myalgias.   Neurological:  Negative for dizziness, light-headedness and headaches.   Psychiatric/Behavioral:  Negative for confusion. The patient is not nervous/anxious.          Objective   LMP 09/30/2023   General: well developed; well nourished  no acute distress   Abdomen: soft, non-tender; no masses  no umbilical or inguinal hernias are present  no hepato-splenomegaly   FHT's: reassuring and category 1      Cervix: was not checked.       Contractions: none         Prenatal Labs  Lab Results   Component Value Date    HGB 13.2 12/19/2023    RUBELLAABIGG 6.67 12/19/2023    HEPBSAG Non-Reactive 12/19/2023    ABSCRN Negative 12/19/2023    FIY6FZS1 Non-Reactive 12/19/2023    HEPCVIRUSABY Non-Reactive 12/19/2023     (H) 04/16/2024    GGTFASTING 90 05/14/2024    FTI1PVCF 181 (H) 05/14/2024    YXA1CPKK 125 05/14/2024    AMW8POTH 109 05/14/2024    URINECX 50,000 CFU/mL Normal Urogenital Germania 05/21/2024    CHLAMNAA negative 11/21/2023    NGONORRHON negative 11/21/2023       Current Labs Reviewed   Ordered to be drawn          Assessment   IUP at 34w6d  Gestational hypertension rule out preeclampsia   Morbid obesity - BMI 51     Plan   Observation with labs (CBC, PEP, 24 hour urine protein collection), BP monitring  NST BID  SCDs    Belinda Kirk MD  5/31/2024  09:21 EDT

## 2024-05-31 NOTE — PROGRESS NOTES
Chief Complaint   Patient presents with    Routine Prenatal Visit     Contractions last night that were about 10 minutes apart; states that she had about 4 of them and lost more of her mucus plug / NST started at 0819       HPI: Liliam is a  currently at 34w6d who today reports the following:  Contractions - YES - but less than 4/hour AND no associated change in vaginal discharge; Leaking - No; Vaginal bleeding -  No; Swelling of extremities - YES.    ROS:  GI: Nausea - No; Constipation - No; Diarrhea - No    Neuro: Headache - No; Visual change - No      EXAM:  Vitals: See prenatal flowsheet   Abdomen: See prenatal flowsheet   Urine glucose/protein: See prenatal flowsheet   Pelvic: See prenatal flowsheet   MDM:   Impression: Supervision of high risk pregnancy  Obesity in pregnancy  Gestational hypertension rule out preeclampsia    Tests done today: NST - reactive   Topics discussed: Continue with PNV's  Prenatal labs reviewed  To [facility] for 24 hour observation with EFM, labs, 24 hour urine protein collection and BP monitoring    Tests scheduled today for her next visit:   U/S for BPP  Non Stress Test      Patient: Liliam Romero  : 2005  MRN: [id number]  CSN: [id number]  Date: 2024    Estimated Date of Delivery: 24  Gestational Age: 34w6d    Indication for NST Obesity in pregnancy   Total time > 40 minutes                    Interpretation    Baseline  beats per minute   Accelerations  Present    Decelerations Absent       Additional Comments Reactive and reassuring        Recommendations for f/u See above        This note has been electronically signed.    Belinda Kirk MD            Moderate: Comprehensive teaching

## 2024-05-31 NOTE — H&P
Liliam Romero  : 2005  MRN: [id number]  CSN: [id number]    History and Physical  CC: Elevated BP in the office   Subjective   Liliam Romero is a 19 y.o. year old  with an Estimated Date of Delivery: 24 currently at 34w6d presented office for routine prenatal care and  testing in the setting of morbid obesity with a BMI 51.  She had a blood pressure reading that was mild range with repeats remaining mild range.  She reports overall feeling well with good fetal movements but has noticed some lower extremity swelling.  She denies any new headaches visual changes right upper quadrant pain or new shortness of breath.    Prenatal care has been with Dr. Kirk.  It has been complicated by morbid obesity, elevated 1 hour GCT - normal 3 hour GTT .    OB History    Para Term  AB Living   1 0 0 0 0 0   SAB IAB Ectopic Molar Multiple Live Births   0 0 0 0 0 0      # Outcome Date GA Lbr Barney/2nd Weight Sex Type Anes PTL Lv   1 Current               Obstetric Comments   FOB - Arun   Fob #1 - Pregnancy #1      Past Medical History:   Diagnosis Date    ADHD     Anxiety     Depression     OCD (obsessive compulsive disorder)     PTSD (post-traumatic stress disorder)     Skin rash     PERSISTENT    Trauma      No past surgical history on file.    Current Outpatient Medications:     docusate sodium (Colace) 100 MG capsule, Take 1 capsule by mouth 2 (Two) Times a Day As Needed for Constipation., Disp: 60 capsule, Rfl: 1    famotidine (Pepcid) 20 MG tablet, Take 1 tablet by mouth 2 (Two) Times a Day., Disp: 60 tablet, Rfl: 3    Prenatal Vit-Fe Fumarate-FA (prenatal vitamin 27-0.8) 27-0.8 MG tablet tablet, Take  by mouth Daily., Disp: , Rfl:     No Known Allergies  Social History    Tobacco Use      Smoking status: Former        Packs/day: 0.00        Types: Cigarettes        Quit date:         Years since quittin.4        Passive exposure: Past      Smokeless tobacco:  Never      Tobacco comments: Vaping     Review of Systems   Constitutional:  Negative for chills and fever.   HENT:  Negative for congestion and sore throat.    Respiratory:  Negative for shortness of breath and wheezing.    Cardiovascular:  Negative for chest pain and palpitations.   Gastrointestinal:  Negative for abdominal pain, nausea and vomiting.   Genitourinary:  Negative for frequency, vaginal bleeding and vaginal pain.   Musculoskeletal:  Negative for back pain and myalgias.   Neurological:  Negative for dizziness, light-headedness and headaches.   Psychiatric/Behavioral:  Negative for confusion. The patient is not nervous/anxious.          Objective   LMP 09/30/2023   General: well developed; well nourished  no acute distress   Abdomen: soft, non-tender; no masses  no umbilical or inguinal hernias are present  no hepato-splenomegaly   FHT's: reassuring and category 1      Cervix: was not checked.       Contractions: none         Prenatal Labs  Lab Results   Component Value Date    HGB 13.2 12/19/2023    RUBELLAABIGG 6.67 12/19/2023    HEPBSAG Non-Reactive 12/19/2023    ABSCRN Negative 12/19/2023    XBB2FAB4 Non-Reactive 12/19/2023    HEPCVIRUSABY Non-Reactive 12/19/2023     (H) 04/16/2024    GGTFASTING 90 05/14/2024    BEP7PYIR 181 (H) 05/14/2024    AHH3AQJS 125 05/14/2024    RCT1AUWB 109 05/14/2024    URINECX 50,000 CFU/mL Normal Urogenital Germania 05/21/2024    CHLAMNAA negative 11/21/2023    NGONORRHON negative 11/21/2023       Current Labs Reviewed   Ordered to be drawn          Assessment   IUP at 34w6d  Gestational hypertension rule out preeclampsia   Morbid obesity - BMI 51     Plan   Observation with labs (CBC, PEP, 24 hour urine protein collection), BP monitring  NST BID  SCDs    Belinda Kirk MD  5/31/2024  09:21 EDT

## 2024-06-01 VITALS
HEART RATE: 78 BPM | DIASTOLIC BLOOD PRESSURE: 59 MMHG | SYSTOLIC BLOOD PRESSURE: 104 MMHG | BODY MASS INDEX: 52.26 KG/M2 | TEMPERATURE: 97.7 F | RESPIRATION RATE: 16 BRPM | WEIGHT: 284 LBS | HEIGHT: 62 IN

## 2024-06-01 LAB
COLLECT DURATION TIME UR: 24 HRS
PROT 24H UR-MRATE: 105 MG/24HOURS (ref 0–150)
SPECIMEN VOL 24H UR: 1500 ML

## 2024-06-01 PROCEDURE — 59025 FETAL NON-STRESS TEST: CPT

## 2024-06-01 PROCEDURE — 99238 HOSP IP/OBS DSCHRG MGMT 30/<: CPT | Performed by: OBSTETRICS & GYNECOLOGY

## 2024-06-01 PROCEDURE — 59025 FETAL NON-STRESS TEST: CPT | Performed by: OBSTETRICS & GYNECOLOGY

## 2024-06-01 PROCEDURE — 84156 ASSAY OF PROTEIN URINE: CPT | Performed by: OBSTETRICS & GYNECOLOGY

## 2024-06-01 PROCEDURE — 81050 URINALYSIS VOLUME MEASURE: CPT | Performed by: OBSTETRICS & GYNECOLOGY

## 2024-06-01 PROCEDURE — G0378 HOSPITAL OBSERVATION PER HR: HCPCS

## 2024-06-01 NOTE — DISCHARGE SUMMARY
[facility]   Liliam Romero  : 2005  MRN: [id number]  CSN: [id number]    Discharge Summary    A formal discharge summary was not needed because this admission was for an observation visit.    Discharge Date: 24   Discharge Dx:    IUP @ 35w0d  Gestational hypertension without severe features   Disposition: home   Condition at discharge: stable   Follow up: On Tuesday with Dr. Kirk         This note has been electronically signed.    Wolfgang Sanchez MD

## 2024-06-01 NOTE — PROGRESS NOTES
Naomy Romero  : 2005  MRN: [id number]  CSN: [id number]    Hospital Day: 2    CC: hospital follow-up for evaluation for preeclampsia    Antepartum Progress Note    Subjective   No new complaints today.  No headache or visual disturbance.  Fetal movement remains normal    Review of Systems   Constitutional:  Negative for unexpected weight change.   Respiratory:  Negative for cough and shortness of breath.    Cardiovascular:  Negative for chest pain.   Gastrointestinal:  Negative for abdominal distention, constipation and diarrhea.        No persistent bloating   Genitourinary:  Negative for difficulty urinating, dysuria, hematuria and urgency.        No significant incontinence   Skin:  Negative for rash.   Neurological:  Negative for headaches.          Objective     Min/max vitals past 24 hours:   Temp  Min: 97.7 °F (36.5 °C)  Max: 98.8 °F (37.1 °C)  BP  Min: 104/59  Max: 134/78  Pulse  Min: 71  Max: 91  Resp  Min: 15  Max: 16         General: well developed; well nourished  no acute distress   Heart: Not performed.   Lungs: breathing is unlabored   Abdomen: soft, non-tender; no masses  no umbilical or inguinal hernias are present  no hepato-splenomegaly   FHT's: reassuring and category 1   Cervix: was not checked.   Contractions: none   Back: CVA tenderness is absent bilateral      CBC:   Lab Results   Component Value Date    WBC 12.80 (H) 2024    HGB 13.0 2024    HCT 39.0 2024     2024     Pre-eclampsia Panel:   Lab Results   Component Value Date    ALKPHOS 91 2024    AST 8 2024    ALT 9 2024    BILITOT <0.2 2024     2024    URICACID 5.6 2024    CREATININE 0.66 2024           Assessment   IUP at 35w0d  Gestational hypertension without severe features     Plan   Okay for discharge to home  Complete 24-hour urine  Follow-up with Dr. Kirk on Tuesday  Preeclampsia symptoms reviewed      Non Stress  Test      Estimated Date of Delivery: 7/6/24  Gestational Age: 35w0d    Indication for NST Gestational hypertension without severe features       Total Time on NST > 20 minutes       Interpretation    Baseline  beats per minute   Category 1   Decelerations Absent       Additional Comments none         Wolfgang Sanchez MD  6/1/2024  10:22 EDT

## 2024-06-01 NOTE — NURSING NOTE
Patient given discharge instructions. Questions and concerns given per MD. 24 hour urine sent to lab. Patient wheeled off unit with PCT in stable condition    short bursts

## 2024-06-03 ENCOUNTER — TELEPHONE (OUTPATIENT)
Dept: OBSTETRICS AND GYNECOLOGY | Facility: CLINIC | Age: 19
End: 2024-06-03
Payer: MEDICAID

## 2024-06-03 NOTE — TELEPHONE ENCOUNTER
Protein, Urine, 24 Hour - Urine, Clean Catch (06/01/2024 11:01)    Called and informed patient of results, she verified understanding, she had already seen results on letter prior to previous phone call.

## 2024-06-04 ENCOUNTER — HOSPITAL ENCOUNTER (OUTPATIENT)
Facility: HOSPITAL | Age: 19
Discharge: HOME | End: 2024-06-04
Attending: OBSTETRICS & GYNECOLOGY | Admitting: OBSTETRICS & GYNECOLOGY
Payer: MEDICAID

## 2024-06-04 ENCOUNTER — ROUTINE PRENATAL (OUTPATIENT)
Dept: OBSTETRICS AND GYNECOLOGY | Facility: CLINIC | Age: 19
End: 2024-06-04
Payer: MEDICAID

## 2024-06-04 VITALS — WEIGHT: 285 LBS | SYSTOLIC BLOOD PRESSURE: 146 MMHG | DIASTOLIC BLOOD PRESSURE: 90 MMHG | BODY MASS INDEX: 52.13 KG/M2

## 2024-06-04 VITALS
WEIGHT: 285 LBS | DIASTOLIC BLOOD PRESSURE: 74 MMHG | BODY MASS INDEX: 52.44 KG/M2 | HEIGHT: 62 IN | TEMPERATURE: 97.6 F | RESPIRATION RATE: 18 BRPM | SYSTOLIC BLOOD PRESSURE: 109 MMHG

## 2024-06-04 DIAGNOSIS — O99.210 OBESITY IN PREGNANCY, ANTEPARTUM: ICD-10-CM

## 2024-06-04 DIAGNOSIS — O99.810 ABNORMAL O'SULLIVAN GLUCOSE CHALLENGE TEST, ANTEPARTUM: ICD-10-CM

## 2024-06-04 DIAGNOSIS — Z3A.35 35 WEEKS GESTATION OF PREGNANCY: Primary | ICD-10-CM

## 2024-06-04 DIAGNOSIS — Z34.03 ENCOUNTER FOR SUPERVISION OF NORMAL FIRST PREGNANCY IN THIRD TRIMESTER: ICD-10-CM

## 2024-06-04 LAB
ALP SERPL-CCNC: 96 U/L (ref 39–117)
ALT SERPL W P-5'-P-CCNC: 6 U/L (ref 1–33)
AST SERPL-CCNC: 9 U/L (ref 1–32)
BILIRUB SERPL-MCNC: 0.2 MG/DL (ref 0–1.2)
COLOR UR: NORMAL
COLOR UR: YELLOW
CREAT SERPL-MCNC: 0.59 MG/DL (ref 0.57–1)
DEPRECATED RDW RBC AUTO: 39.9 FL (ref 37–54)
ERYTHROCYTE [DISTWIDTH] IN BLOOD BY AUTOMATED COUNT: 13.2 % (ref 12.3–15.4)
GLUCOSE UR STRIP-MCNC: NEGATIVE MG/DL
GLUCOSE UR STRIP-MCNC: NEGATIVE MG/DL
HCT VFR BLD AUTO: 38.5 % (ref 34–46.6)
HGB BLD-MCNC: 12.7 G/DL (ref 12–15.9)
LDH SERPL-CCNC: 230 U/L (ref 135–214)
MCH RBC QN AUTO: 27.7 PG (ref 26.6–33)
MCHC RBC AUTO-ENTMCNC: 33 G/DL (ref 31.5–35.7)
MCV RBC AUTO: 83.9 FL (ref 79–97)
PLATELET # BLD AUTO: 249 10*3/MM3 (ref 140–450)
PMV BLD AUTO: 12 FL (ref 6–12)
PROT UR STRIP-MCNC: NEGATIVE MG/DL
PROT UR STRIP-MCNC: NEGATIVE MG/DL
RBC # BLD AUTO: 4.59 10*6/MM3 (ref 3.77–5.28)
URATE SERPL-MCNC: 6.8 MG/DL (ref 2.4–5.7)
WBC NRBC COR # BLD AUTO: 12.56 10*3/MM3 (ref 3.4–10.8)

## 2024-06-04 PROCEDURE — 36415 COLL VENOUS BLD VENIPUNCTURE: CPT | Performed by: OBSTETRICS & GYNECOLOGY

## 2024-06-04 PROCEDURE — 84075 ASSAY ALKALINE PHOSPHATASE: CPT | Performed by: OBSTETRICS & GYNECOLOGY

## 2024-06-04 PROCEDURE — 84550 ASSAY OF BLOOD/URIC ACID: CPT | Performed by: OBSTETRICS & GYNECOLOGY

## 2024-06-04 PROCEDURE — 85027 COMPLETE CBC AUTOMATED: CPT | Performed by: OBSTETRICS & GYNECOLOGY

## 2024-06-04 PROCEDURE — 84450 TRANSFERASE (AST) (SGOT): CPT | Performed by: OBSTETRICS & GYNECOLOGY

## 2024-06-04 PROCEDURE — 84460 ALANINE AMINO (ALT) (SGPT): CPT | Performed by: OBSTETRICS & GYNECOLOGY

## 2024-06-04 PROCEDURE — 83615 LACTATE (LD) (LDH) ENZYME: CPT | Performed by: OBSTETRICS & GYNECOLOGY

## 2024-06-04 PROCEDURE — G0463 HOSPITAL OUTPT CLINIC VISIT: HCPCS

## 2024-06-04 PROCEDURE — 82565 ASSAY OF CREATININE: CPT | Performed by: OBSTETRICS & GYNECOLOGY

## 2024-06-04 PROCEDURE — 59025 FETAL NON-STRESS TEST: CPT

## 2024-06-04 PROCEDURE — 82247 BILIRUBIN TOTAL: CPT | Performed by: OBSTETRICS & GYNECOLOGY

## 2024-06-04 PROCEDURE — 99213 OFFICE O/P EST LOW 20 MIN: CPT | Performed by: NURSE PRACTITIONER

## 2024-06-04 NOTE — PROGRESS NOTES
Chief Complaint   Patient presents with    Routine Prenatal Visit     Ob follow up after US       HPI: Liliam is a  currently at 35w3d who today reports the following: Initial blood pressure 146/98 with repeat of 130/80.  She does report headache last night went away without treatment.  She reports good fetal movement.  She had a hospital admission last week and was diagnosed with gestational hypertension.  Her 24-hour urine and labs were normal.  Contractions - No; Leaking - No; Vaginal bleeding -  No; Swelling of extremities - legs at end of day    ROS:  GI: Nausea - No; Constipation - No; Diarrhea - No    Neuro: Headache - No; Visual change - No      EXAM:  Vitals: See prenatal flowsheet   Abdomen: See prenatal flowsheet   Urine glucose/protein: See prenatal flowsheet   Pelvic: See prenatal flowsheet   MDM:   Impression: Supervision of high risk pregnancy  Obesity in pregnancy  Gestational hypertension rule out preeclampsia   Tests done today: BPP -   gbs   Topics discussed: Continue with PNV's  Prenatal labs reviewed   labor signs and symptoms  Symptoms of preeclampsia  Kick counts reviewed   To labor and delivery today for serial blood pressure monitoring and repeat preeclampsia blood work.  If discharged home keep follow-up appointment as scheduled with Dr. Kirk on Friday.   Tests scheduled today for her next visit:   JEY

## 2024-06-04 NOTE — H&P
[facility]  Obstetric History and Physical    Chief Complaint   Patient presents with    Elevated Blood Pressure       Subjective     Patient is a 19 y.o. female  currently at 35w3d, who presents from the office with elevated blood pressures. She denies LOF, vaginal bleeding. She has been having irregular contractions. She reports normal fetal movement. She gets a headache after sexual intercourse. She does not have one currently. She denies visual changes. She does intermittently have epigastric pain but thinks it is related to when she has contractions.    Her prenatal care is complicated by  hypertension  gestational hypertension and morbid obesity, impaired glucose tolerance (abnormal 1 hour, 3/4 normal values on GTT).  Her previous obstetric/gynecological history is noted for is non-contributory.    The following portions of the patients history were reviewed and updated as appropriate: current medications, allergies, past medical history, past surgical history, past family history, past social history, and problem list .       Prenatal Information:  Prenatal Results       Initial Prenatal Labs       Test Value Reference Range Date Time    Hemoglobin  13.2 g/dL 12.0 - 15.9 23 1625       14.5 g/dL 12.0 - 15.9 23 2319    Hematocrit  40.7 % 34.0 - 46.6 23 1625       45.3 % 34.0 - 46.6 23 2319    Platelets  354 10*3/mm3 140 - 450 23 1625       406 10*3/mm3 140 - 450 23 2319    Rubella IgG  6.67 index Immune >0.99 23 1625    Hepatitis B SAg  Non-Reactive  Non-Reactive 23 1625    Hepatitis C Ab  Non-Reactive  Non-Reactive 23 1625    RPR  Non-Reactive  Non-Reactive 23 1625    T. Pallidum Ab   Non-Reactive  Non-Reactive 24 1200    ABO  B   24 1200    Rh  Positive   24 1200    Antibody Screen  Negative   23 1625    HIV  Non-Reactive  Non-Reactive 23 1625    Urine Culture  50,000 CFU/mL Normal Urogenital Germania   24  1524       No growth   11/21/23 1520    Gonorrhea ^ negative   11/21/23     Chlamydia ^ negative   11/21/23     TSH        HgB A1c         Varicella IgG        HgB Electrophoresis         Hemoglobinopathy (genetic testing)        Cystic fibrosis  ^ NEGATIVE   12/19/23               Fetal testing        Test Value Reference Range Date Time    NIPT        MSAFP        AFP-4                  2nd and 3rd Trimester       Test Value Reference Range Date Time    Hemoglobin (repeated)  13.0 g/dL 12.0 - 15.9 05/31/24 1200    Hematocrit (repeated)  39.0 % 34.0 - 46.6 05/31/24 1200    Platelets   264 10*3/mm3 140 - 450 05/31/24 1200       354 10*3/mm3 140 - 450 12/19/23 1625       406 10*3/mm3 140 - 450 11/12/23 2319    1 hour GTT   146 mg/dL 65 - 139 04/16/24 1035    Antibody Screen (repeated)  Negative   05/31/24 1200    3rd TM syphilis scrn (repeated)  RPR         3rd TM syphilis scrn (repeated) FTA        GTT Fasting  90 mg/dL 65 - 94 05/14/24 1238    GTT 1 Hr  181 mg/dL 65 - 179 05/14/24 1238    GTT 2 Hr  125 mg/dL 65 - 154 05/14/24 1454    GTT 3 Hr  109 mg/dL 65 - 139 05/14/24 0354    Group B Strep                  Other testing        Test Value Reference Range Date Time    Parvo IgG         CMV IgG                   Drug Screening       Test Value Reference Range Date Time    Amphetamine Screen  Negative  Negative 11/21/23 1520    Barbiturate Screen  Negative  Negative 11/21/23 1520    Benzodiazepine Screen  Negative  Negative 11/21/23 1520    Methadone Screen  Negative  Negative 11/21/23 1520    Phencyclidine Screen  Negative  Negative 11/21/23 1520    Opiates Screen  Negative  Negative 11/21/23 1520    THC Screen  Positive  Negative 11/21/23 1520    Cocaine Screen  Negative  Negative 11/21/23 1520    Propoxyphene Screen        Buprenorphine Screen  Negative  Negative 11/21/23 1520    Methamphetamine Screen  Negative  Negative 11/21/23 1520    Oxycodone Screen  Negative  Negative 11/21/23 1520    Tricyclic  Antidepressants Screen  Negative  Negative 11/21/23 1520              Legend    ^: Historical                          External Prenatal Results       Pregnancy Outside Results - Transcribed From Office Records - See Scanned Records For Details       Test Value Date Time    ABO  B  05/31/24 1200    Rh  Positive  05/31/24 1200    Antibody Screen  Negative  05/31/24 1200       Negative  12/19/23 1625    Varicella IgG       Rubella  6.67 index 12/19/23 1625    Hgb  13.0 g/dL 05/31/24 1200       13.2 g/dL 12/19/23 1625       14.5 g/dL 11/12/23 2319    Hct  39.0 % 05/31/24 1200       40.7 % 12/19/23 1625       45.3 % 11/12/23 2319    HgB A1c        1h GTT  146 mg/dL 04/16/24 1035    3h GTT Fasting  90 mg/dL 05/14/24 1238    3h GTT 1 hour  181 mg/dL 05/14/24 1238    3h GTT 2 hour  125 mg/dL 05/14/24 1454    3h GTT 3 hour   109 mg/dL 05/14/24 0354    Gonorrhea (discrete) ^ negative  11/21/23     Chlamydia (discrete) ^ negative  11/21/23     RPR  Non-Reactive  12/19/23 1625    Syphilis Antibody       HBsAg  Non-Reactive  12/19/23 1625    Herpes Simplex Virus PCR       Herpes Simplex VIrus Culture       HIV  Non-Reactive  12/19/23 1625    Hep C RNA Quant PCR       Hep C Antibody  Non-Reactive  12/19/23 1625    AFP       NIPT       Cystic Fibroisis  ^ NEGATIVE  12/19/23     Group B Strep       GBS Susceptibility to Clindamycin       GBS Susceptibility to Erythromycin       Fetal Fibronectin       Genetic Testing, Maternal Blood                 Drug Screening       Test Value Date Time    Urine Drug Screen       Amphetamine Screen  Negative  11/21/23 1520    Barbiturate Screen  Negative  11/21/23 1520    Benzodiazepine Screen  Negative  11/21/23 1520    Methadone Screen  Negative  11/21/23 1520    Phencyclidine Screen  Negative  11/21/23 1520    Opiates Screen  Negative  11/21/23 1520    THC Screen  Positive  11/21/23 1520    Cocaine Screen       Propoxyphene Screen       Buprenorphine Screen  Negative  11/21/23 1520     Methamphetamine Screen       Oxycodone Screen  Negative  23 1520    Tricyclic Antidepressants Screen  Negative  23 1520              Legend    ^: Historical                             Past OB History:     OB History    Para Term  AB Living   1 0 0 0 0 0   SAB IAB Ectopic Molar Multiple Live Births   0 0 0 0 0 0      # Outcome Date GA Lbr Barney/2nd Weight Sex Type Anes PTL Lv   1 Current               Obstetric Comments   FOB - Arun   Fob #1 - Pregnancy #1        Past Medical History: Past Medical History:   Diagnosis Date    ADHD     Anxiety     Depression     OCD (obsessive compulsive disorder)     PTSD (post-traumatic stress disorder)     Skin rash     PERSISTENT    Trauma       Past Surgical History History reviewed. No pertinent surgical history.   Family History: Family History   Problem Relation Age of Onset    Ovarian cancer Mother     Ovarian cancer Maternal Grandmother     Breast cancer Neg Hx     Colon cancer Neg Hx     Osteoporosis Neg Hx       Social History:  reports that she quit smoking about 2 years ago. Her smoking use included cigarettes. She has been exposed to tobacco smoke. She has never used smokeless tobacco.   reports no history of alcohol use.   reports that she does not currently use drugs after having used the following drugs: Marijuana.        Review of Systems      Objective     Vital Signs Range for the last 24 hours  Temperature: Temp:  [97.6 °F (36.4 °C)] 97.6 °F (36.4 °C)   Temp Source: Temp src: Oral   BP: BP: (121-146)/(86-90) 125/90   Pulse:     Respirations: Resp:  [18] 18   SPO2:     O2 Amount (l/min):     O2 Devices     Weight: Weight:  [129 kg (285 lb)] 129 kg (285 lb)     Physical Examination: General appearance - alert, well appearing, and in no distress  Chest - unlabored respirations  Heart - no JVD  Abdomen - soft, nontender, nondistended, no masses or organomegaly  Neurological - alert, oriented, normal speech, no focal findings or movement  "disorder noted  Musculoskeletal - no joint tenderness, deformity or swelling  Extremities - peripheral pulses normal, no pedal edema, no clubbing or cyanosis  Skin - normal coloration and turgor, no rashes, no suspicious skin lesions noted    Presentation: Not assessed   Cervix: Exam by:     Dilation:     Effacement:     Station:       Fetal Heart Rate Assessment   Method:  external   Beats/min:  140   Baseline:     Variability:  moderate   Accels:  present   Decels:  absent   Tracing Category:  reactive     Uterine Assessment   Method: Method: palpation   Frequency (min):     Ctx Count in 10 min:     Duration:     Intensity: Contraction Intensity: no contractions   Intensity by IUPC:     Resting Tone: Uterine Resting Tone: soft by palpation   Resting Tone by IUPC:     Jolley Units:       Laboratory Results: hgb 12.7 plt 249 ast 9 alt 6 creatinine 0.59 uric acid 6.8   Radiology Review: none  Other Studies: recent 24 hour urine protein 105    Assessment & Plan       Obesity in pregnancy, antepartum    Elevated 1 hour GCT - normal 3 hour GTT    Gestational hypertension, third trimester      Assessment & Plan    Assessment:  1.  Intrauterine pregnancy at 35w3d gestation with reactive fetal status.    2.  Gestational hypertension  3.  Obstetrical history significant for is non-contributory.  4.  GBS status: No results found for: \"STREPGPB\"    Plan:  1. Gestational hypertension: no severe features. BP initially mild range but subsequently normalized. Labs reviewed as above, no severe features. Stable for outpatient management. Delivery has been moved up to 37 weeks,  patient given appointment and is aware.   2. Plan of care has been reviewed with patient and partner  3.  Risks, benefits of treatment plan have been discussed.  4.  All questions have been answered.  5.  Keep established outpatient appointments and antepartum surveillance      Rosalind Billings MD  6/4/2024  11:13 EDT    "

## 2024-06-05 ENCOUNTER — PATIENT OUTREACH (OUTPATIENT)
Dept: LABOR AND DELIVERY | Facility: HOSPITAL | Age: 19
End: 2024-06-05
Payer: MEDICAID

## 2024-06-05 NOTE — OUTREACH NOTE
[facility]  Unable to Reach       Questions/Answers      Flowsheet Row Responses   Pending Outreach Prenatal Check-in   Call Attempt First   Outcome No answer/busy, Left message   Next Call Attempt Date 06/10/24   Unable to reach comments: will attempt contact next week                Soumya Ferguson RN  Maternity Nurse Navigator    6/5/2024, 13:21 EDT

## 2024-06-07 ENCOUNTER — LAB (OUTPATIENT)
Dept: LAB | Facility: HOSPITAL | Age: 19
End: 2024-06-07
Payer: MEDICAID

## 2024-06-07 ENCOUNTER — ROUTINE PRENATAL (OUTPATIENT)
Dept: OBSTETRICS AND GYNECOLOGY | Facility: CLINIC | Age: 19
End: 2024-06-07
Payer: MEDICAID

## 2024-06-07 VITALS — DIASTOLIC BLOOD PRESSURE: 80 MMHG | BODY MASS INDEX: 51.94 KG/M2 | WEIGHT: 284 LBS | SYSTOLIC BLOOD PRESSURE: 120 MMHG

## 2024-06-07 DIAGNOSIS — O13.3 GESTATIONAL HYPERTENSION, THIRD TRIMESTER: ICD-10-CM

## 2024-06-07 DIAGNOSIS — O99.810 ABNORMAL O'SULLIVAN GLUCOSE CHALLENGE TEST, ANTEPARTUM: ICD-10-CM

## 2024-06-07 DIAGNOSIS — O23.43 URINARY TRACT INFECTION IN MOTHER DURING THIRD TRIMESTER OF PREGNANCY: ICD-10-CM

## 2024-06-07 DIAGNOSIS — Z34.03 ENCOUNTER FOR SUPERVISION OF NORMAL FIRST PREGNANCY IN THIRD TRIMESTER: Primary | ICD-10-CM

## 2024-06-07 DIAGNOSIS — O99.210 OBESITY IN PREGNANCY, ANTEPARTUM: ICD-10-CM

## 2024-06-07 LAB
ALP SERPL-CCNC: 100 U/L (ref 39–117)
ALT SERPL W P-5'-P-CCNC: 10 U/L (ref 1–33)
AST SERPL-CCNC: 9 U/L (ref 1–32)
BILIRUB SERPL-MCNC: 0.2 MG/DL (ref 0–1.2)
CLARITY, POC: ABNORMAL
COLOR UR: ABNORMAL
CREAT SERPL-MCNC: 0.6 MG/DL (ref 0.57–1)
DEPRECATED RDW RBC AUTO: 41.2 FL (ref 37–54)
ERYTHROCYTE [DISTWIDTH] IN BLOOD BY AUTOMATED COUNT: 13.2 % (ref 12.3–15.4)
GLUCOSE UR STRIP-MCNC: NEGATIVE MG/DL
GLUCOSE UR STRIP-MCNC: NEGATIVE MG/DL
GP B STREP RRNA SPEC QL PROBE: POSITIVE
HCT VFR BLD AUTO: 40.7 % (ref 34–46.6)
HGB BLD-MCNC: 13.3 G/DL (ref 12–15.9)
HOLD SPECIMEN: NORMAL
LDH SERPL-CCNC: 191 U/L (ref 135–214)
LEUKOCYTE EST, POC: ABNORMAL
MCH RBC QN AUTO: 28.1 PG (ref 26.6–33)
MCHC RBC AUTO-ENTMCNC: 32.7 G/DL (ref 31.5–35.7)
MCV RBC AUTO: 85.9 FL (ref 79–97)
NITRITE UR-MCNC: POSITIVE MG/ML
PLATELET # BLD AUTO: 264 10*3/MM3 (ref 140–450)
PMV BLD AUTO: 12.1 FL (ref 6–12)
PROT UR STRIP-MCNC: ABNORMAL MG/DL
PROT UR STRIP-MCNC: ABNORMAL MG/DL
RBC # BLD AUTO: 4.74 10*6/MM3 (ref 3.77–5.28)
URATE SERPL-MCNC: 6.4 MG/DL (ref 2.4–5.7)
WBC NRBC COR # BLD AUTO: 13.19 10*3/MM3 (ref 3.4–10.8)

## 2024-06-07 PROCEDURE — 82247 BILIRUBIN TOTAL: CPT

## 2024-06-07 PROCEDURE — 84460 ALANINE AMINO (ALT) (SGPT): CPT

## 2024-06-07 PROCEDURE — 83615 LACTATE (LD) (LDH) ENZYME: CPT

## 2024-06-07 PROCEDURE — 81001 URINALYSIS AUTO W/SCOPE: CPT | Performed by: OBSTETRICS & GYNECOLOGY

## 2024-06-07 PROCEDURE — 87086 URINE CULTURE/COLONY COUNT: CPT | Performed by: OBSTETRICS & GYNECOLOGY

## 2024-06-07 PROCEDURE — 82565 ASSAY OF CREATININE: CPT

## 2024-06-07 PROCEDURE — 84450 TRANSFERASE (AST) (SGOT): CPT

## 2024-06-07 PROCEDURE — 84075 ASSAY ALKALINE PHOSPHATASE: CPT

## 2024-06-07 PROCEDURE — 84550 ASSAY OF BLOOD/URIC ACID: CPT

## 2024-06-07 PROCEDURE — 85027 COMPLETE CBC AUTOMATED: CPT

## 2024-06-07 RX ORDER — ADHESIVE BANDAGE 3/4"
1 BANDAGE TOPICAL 2 TIMES DAILY
Qty: 1 EACH | Refills: 0 | Status: SHIPPED | OUTPATIENT
Start: 2024-06-07

## 2024-06-07 NOTE — PROGRESS NOTES
Chief Complaint   Patient presents with    Routine Prenatal Visit     NST started at 0814       HPI: Liliam is a  currently at 35w6d who today reports the following:  Contractions - No; Leaking - No; Vaginal bleeding -  No; Swelling of extremities - No.    ROS:  GI: Nausea - No; Constipation - No; Diarrhea - No    Neuro: Headache - No; Visual change - No      EXAM:  Vitals: See prenatal flowsheet   Abdomen: See prenatal flowsheet   Urine glucose/protein: See prenatal flowsheet   Pelvic: See prenatal flowsheet    Cervix 1-260/-2   MDM:   Impression: Supervision of high risk pregnancy  Obesity in pregnancy  Gestational hypertension without severe features (normal labs 24)   Tests done today: NST - non-reactive    Topics discussed: Continue with PNV's  Prenatal labs reviewed   labor signs and symptoms  Symptoms of preeclampsia  Reviewed BP parameters . Repeat labs today given 3+ urine protein. BP overall reassuring.   Reviewed IOL at 37 weeks for GHTN or sooner as indicated if evidence of severe features   Tests scheduled today for her next visit:   U/S for BPP     Orders Placed This Encounter   Procedures    US Fetal Biophysical Profile;Without Non-Stress Testing     Standing Status:   Future     Number of Occurrences:   1     Standing Expiration Date:   2025     Order Specific Question:   Reason for Exam:     Answer:   GHTN, obesity     Order Specific Question:   Release to patient     Answer:   Routine Release [[id number]]    CBC (No Diff)     Standing Status:   Future     Standing Expiration Date:   2025     Order Specific Question:   Release to patient     Answer:   Routine Release [[id number]]    Preeclampsia Panel     Standing Status:   Future     Standing Expiration Date:   2025     Order Specific Question:   Release to patient     Answer:   Routine Release [[id number]]    Urinalysis With Culture If Indicated - Urine, Clean Catch     Standing Status:   Future     Number of  Occurrences:   1     Standing Expiration Date:   2025     Order Specific Question:   Release to patient     Answer:   Routine Release [[id number]]    POC Urinalysis Dipstick     This order was created through External Result Entry     Order Specific Question:   Release to patient     Answer:   Routine Release [[id number]]    POC Urinalysis Dipstick     This order was created through External Result Entry     Order Specific Question:   Release to patient     Answer:   Routine Release [[id number]]     New Medications Ordered This Visit   Medications    Blood Pressure Monitoring (Blood Pressure Cuff) misc     Sig: Use 1 Device 2 (Two) Times a Day. Please check your BP twice daily and record. Please let us know if you are getting readings above 150 top number or 100 bottom number     Dispense:  1 each     Refill:  0     Non Stress Test      Patient: Liliam Romero  : 2005  MRN: [id number]  CSN: [id number]  Date: 2024    Estimated Date of Delivery: 24  Gestational Age: 35w6d    Indication for NST pregnancy-induced hypertension and obesity  Total time > 1 hour                   Interpretation    Baseline  beats per minute   Accelerations  One 15 x 15, a few 10 x 10 accels    Decelerations Absent       Additional Comments Non reactive       Recommendations for f/u See above (add on BPP u/s)       This note has been electronically signed.    Belinda Kirk MD

## 2024-06-08 LAB
BACTERIA UR QL AUTO: ABNORMAL /HPF
BILIRUB UR QL STRIP: NEGATIVE
CLARITY UR: ABNORMAL
COLOR UR: YELLOW
GLUCOSE UR STRIP-MCNC: NEGATIVE MG/DL
HGB UR QL STRIP.AUTO: NEGATIVE
HYALINE CASTS UR QL AUTO: ABNORMAL /LPF
KETONES UR QL STRIP: NEGATIVE
LEUKOCYTE ESTERASE UR QL STRIP.AUTO: ABNORMAL
NITRITE UR QL STRIP: NEGATIVE
PH UR STRIP.AUTO: 6.5 [PH] (ref 5–8)
PROT UR QL STRIP: ABNORMAL
RBC # UR STRIP: ABNORMAL /HPF
REF LAB TEST METHOD: ABNORMAL
SP GR UR STRIP: >1.03 (ref 1–1.03)
SPERM URNS QL MICRO: PRESENT /HPF
SQUAMOUS #/AREA URNS HPF: ABNORMAL /HPF
UROBILINOGEN UR QL STRIP: ABNORMAL
WBC # UR STRIP: ABNORMAL /HPF

## 2024-06-09 LAB — BACTERIA SPEC AEROBE CULT: NO GROWTH

## 2024-06-10 ENCOUNTER — PATIENT OUTREACH (OUTPATIENT)
Dept: LABOR AND DELIVERY | Facility: HOSPITAL | Age: 19
End: 2024-06-10
Payer: MEDICAID

## 2024-06-10 NOTE — OUTREACH NOTE
[facility]  Unable to Reach       Questions/Answers      Flowsheet Row Responses   Pending Outreach Prenatal Check-in   Call Attempt Second   Outcome No answer/busy, Left message   Next Call Attempt Date 06/12/24   Unable to reach comments: will attempt contact later this week                Soumya Ferguson RN  Maternity Nurse Navigator    6/10/2024, 14:39 EDT

## 2024-06-11 ENCOUNTER — HOSPITAL ENCOUNTER (OUTPATIENT)
Dept: WOMENS IMAGING | Facility: HOSPITAL | Age: 19
Discharge: HOME | End: 2024-06-11
Admitting: OBSTETRICS & GYNECOLOGY
Payer: MEDICAID

## 2024-06-11 ENCOUNTER — ROUTINE PRENATAL (OUTPATIENT)
Dept: OBSTETRICS AND GYNECOLOGY | Facility: CLINIC | Age: 19
End: 2024-06-11
Payer: MEDICAID

## 2024-06-11 ENCOUNTER — OFFICE VISIT (OUTPATIENT)
Dept: OBSTETRICS AND GYNECOLOGY | Facility: HOSPITAL | Age: 19
End: 2024-06-11
Payer: MEDICAID

## 2024-06-11 ENCOUNTER — PATIENT OUTREACH (OUTPATIENT)
Dept: LABOR AND DELIVERY | Facility: HOSPITAL | Age: 19
End: 2024-06-11
Payer: MEDICAID

## 2024-06-11 VITALS — WEIGHT: 285.4 LBS | DIASTOLIC BLOOD PRESSURE: 89 MMHG | BODY MASS INDEX: 52.2 KG/M2 | SYSTOLIC BLOOD PRESSURE: 133 MMHG

## 2024-06-11 VITALS — WEIGHT: 285 LBS | DIASTOLIC BLOOD PRESSURE: 80 MMHG | BODY MASS INDEX: 52.13 KG/M2 | SYSTOLIC BLOOD PRESSURE: 130 MMHG

## 2024-06-11 DIAGNOSIS — O99.210 OBESITY IN PREGNANCY, ANTEPARTUM: ICD-10-CM

## 2024-06-11 DIAGNOSIS — O28.5 ABNORMAL GENETIC TEST DURING PREGNANCY: Primary | ICD-10-CM

## 2024-06-11 DIAGNOSIS — O13.3 GESTATIONAL HYPERTENSION, THIRD TRIMESTER: ICD-10-CM

## 2024-06-11 DIAGNOSIS — Z3A.36 36 WEEKS GESTATION OF PREGNANCY: Primary | ICD-10-CM

## 2024-06-11 DIAGNOSIS — O99.810 ABNORMAL O'SULLIVAN GLUCOSE CHALLENGE TEST, ANTEPARTUM: ICD-10-CM

## 2024-06-11 DIAGNOSIS — O28.5 ABNORMAL GENETIC TEST DURING PREGNANCY: ICD-10-CM

## 2024-06-11 DIAGNOSIS — Z3A.36 36 WEEKS GESTATION OF PREGNANCY: ICD-10-CM

## 2024-06-11 LAB
COLOR UR: NORMAL
GLUCOSE UR STRIP-MCNC: NEGATIVE MG/DL
PROT UR STRIP-MCNC: NEGATIVE MG/DL

## 2024-06-11 PROCEDURE — 99213 OFFICE O/P EST LOW 20 MIN: CPT | Performed by: OBSTETRICS & GYNECOLOGY

## 2024-06-11 PROCEDURE — 76819 FETAL BIOPHYS PROFIL W/O NST: CPT

## 2024-06-11 PROCEDURE — 76816 OB US FOLLOW-UP PER FETUS: CPT | Performed by: OBSTETRICS & GYNECOLOGY

## 2024-06-11 PROCEDURE — 76819 FETAL BIOPHYS PROFIL W/O NST: CPT | Performed by: OBSTETRICS & GYNECOLOGY

## 2024-06-11 PROCEDURE — 99213 OFFICE O/P EST LOW 20 MIN: CPT | Performed by: NURSE PRACTITIONER

## 2024-06-11 PROCEDURE — 76816 OB US FOLLOW-UP PER FETUS: CPT

## 2024-06-11 NOTE — PROGRESS NOTES
Chief Complaint   Patient presents with    Routine Prenatal Visit     Ob follow up after PDC       HPI: Liliam is a  currently at 36w3d who today reports the following: She reports good fetal movement. Had u/s at [facility] today   Contractions - No; Leaking - No; Vaginal bleeding -  No; Swelling of extremities - No.    ROS:  GI: Nausea - No; Constipation - No; Diarrhea - No    Neuro: Headache - No; Visual change - No      EXAM:  Vitals: See prenatal flowsheet   Abdomen: See prenatal flowsheet   Urine glucose/protein: See prenatal flowsheet   Pelvic: See prenatal flowsheet   MDM:   Impression: Supervision of high risk pregnancy  Obesity in pregnancy  Gestational hypertension without severe features normal blood work 24   Tests done today: none  U/S done at [facility] for growth bpp  ashlyn  16.1 normal umbilical artery dopplers   Topics discussed: Continue with PNV's  Prenatal labs reviewed   labor signs and symptoms  Symptoms of preeclampsia   Kick counts reviewed   Continue twice weekly  testing, has iol scheduled at 37 weeks   Tests scheduled today for her next visit:   JEY

## 2024-06-11 NOTE — PROGRESS NOTES
"    Maternal/Fetal Medicine Consult Note     Name: Liliam Romero    : 2005     MRN: [id number]     Referring Provider: Belinda Kirk MD    Chief Complaint  Inconclusive NIPT, SO, GHTN    Subjective     History of Present Illness:  Liliam Romero is a 19 y.o.  36w3d who presents today for a follow-up ultrasound to assess interval growth and fetal wellbeing.     Pt denies LOF/VB/ctx's. +FM.     RADHA: Estimated Date of Delivery: 24     ROS:   As noted in HPI.     Past Medical History:   Diagnosis Date    ADHD     Anxiety     Depression     Gestational hypertension     no meds    OCD (obsessive compulsive disorder)     PTSD (post-traumatic stress disorder)     Scoliosis     Skin rash     PERSISTENT    Trauma       No past surgical history on file.   OB History          1    Para   0    Term   0       0    AB   0    Living   0         SAB   0    IAB   0    Ectopic   0    Molar   0    Multiple   0    Live Births   0          Obstetric Comments   FOB - Arun  Fob #1 - Pregnancy #1                Objective     Vital Signs  /89   Wt 129 kg (285 lb 6.4 oz)   LMP 2023   Estimated body mass index is 52.2 kg/m² as calculated from the following:    Height as of 24: 157.5 cm (62\").    Weight as of this encounter: 129 kg (285 lb 6.4 oz).    Physical Exam  Constitutional:       Appearance: Normal appearance. She is obese.   HENT:      Head: Normocephalic and atraumatic.   Pulmonary:      Effort: Pulmonary effort is normal.   Musculoskeletal:         General: Normal range of motion.   Neurological:      General: No focal deficit present.      Mental Status: She is alert and oriented to person, place, and time.   Psychiatric:         Mood and Affect: Mood normal.         Behavior: Behavior normal.         Thought Content: Thought content normal.         Judgment: Judgment normal.     Ultrasound Impression:   Vtx, EFW 43rd%, AC 38th%, nl WILY, posterior placenta, " limited but normal anatomy, BPP 8/8. UA Dopplers wnl.     Assessment and Plan     Diagnoses and all orders for this visit:    1. Abnormal genetic test during pregnancy (Primary)  Assessment & Plan:  S/p inconclusive NIPT secondary to low fetal fraction.   Anatomy survey within normal limits.       2. Elevated 1 hour GCT - normal 3 hour GTT    3. Gestational hypertension, third trimester  Assessment & Plan:  BP today 133/89. IOL scheduled for Leonardo night.       4. 36 weeks gestation of pregnancy         Follow Up  No follow-ups on file.    I spent 10 minutes caring for the patient on the day of service. This included: obtaining or reviewing a separately obtained medical history, reviewing patient records, performing a medically appropriate exam and/or evaluation, counseling or educating the patient/family/caregiver, ordering medications, labs, and/or procedures and documenting such in the medical record. This does not include time spent on review and interpretation of other tests such as fetal ultrasound or the performance of other procedures such as amniocentesis or CVS.      Becca Thrasher MD  06/11/2024

## 2024-06-11 NOTE — OUTREACH NOTE
"[facility]  Check-In    Current Estimated Gestational Age: 36w3d      Questions/Answers      Flowsheet Row Responses   Best Method for Contacting Cell   Demographics Reviewed Yes   Currently Employed No   Able to keep appointments as scheduled Yes   Gender(s) and Name(s) Boy-Kb Pemberton   Baby Active/Feeling Fetal Movemen Yes   How are you presently feeling? good   Are you having any of the following symptoms? --  [Denies]   Questions regarding prenatal visits or tests to be ordered? No   New Diagnosis HTN   Chronic or Gestational HTN Gestational   Equipment presently used at home: Blood Pressure Cuff   Education related to new diagnoses/home equipment No   Supplies ready for baby Car Seat, Clothing, Crib, Diapers, Feeding Supplies   Resource/Environmental Concerns Financial, Reliable Transportation   Do you have any questions related to your care experience, your pregnancy, plans for delivery, any concerns, etc? Yes   Question Discussed the \"Davey Hour\".   Other Education How to find a pediatrician, Insurance benefits/Incentives, Transportation Assistance, WIC Benefits              VAG  Feeling well and reports good fetal movement. Discussed what to expect when she goes in to L&D including admission process, induction process, comfort measures, positioning, pain medication/Fentanyl, epidural placement, delivery, skin to skin, breastfeeding, post-delivery, and MB care. VU.    She is feeling ready for delivery. She has an appointment to enroll in WIC and feels like she has everything she will need for baby. We discussed scheduling her WIC appointment for a few days following her induction date in order to avoid delays in procuring formula for the baby. VU.    Updated resources for choosing a Pediatrician, transportation assistance, WIC benefits and breast milk suppression provided via [employer] message. Contact information provided. Encouraged to call with questions, concerns, or for support. Will plan to " see inpatient after delivery.      Soumya Ferguson RN  Maternity Nurse Navigator    6/11/2024, 15:59 EDT

## 2024-06-11 NOTE — PROGRESS NOTES
F/u with Eduardo today  NIPT inconclusive  Pt reports recent diagnosis of GHTN/ Induction planned next week on 6/17

## 2024-06-11 NOTE — LETTER
"2024     Belinda Kirk MD  [address] [facility]  [address] [place] [address]    Patient: Liliam Romero   YOB: 2005   Date of Visit: 2024       Dear Belinda Kirk MD,    Thank you for referring Liliam Romero to me for evaluation. Below is a copy of my consult note.    If you have questions, please do not hesitate to call me. I look forward to following iLliam along with you.         Sincerely,        Becca Thrasher MD        CC: No Recipients                    Maternal/Fetal Medicine Consult Note     Name: Liliam Romero    : 2005     MRN: [id number]     Referring Provider: Belinda Kirk MD    Chief Complaint  Inconclusive NIPT, SO, GHTN    Subjective     History of Present Illness:  Liliam Romero is a 19 y.o.  36w3d who presents today for a follow-up ultrasound to assess interval growth and fetal wellbeing.     Pt denies LOF/VB/ctx's. +FM.     RADHA: Estimated Date of Delivery: 24     ROS:   As noted in HPI.     Past Medical History:   Diagnosis Date   • ADHD    • Anxiety    • Depression    • Gestational hypertension     no meds   • OCD (obsessive compulsive disorder)    • PTSD (post-traumatic stress disorder)    • Scoliosis    • Skin rash     PERSISTENT   • Trauma       No past surgical history on file.   OB History          1    Para   0    Term   0       0    AB   0    Living   0         SAB   0    IAB   0    Ectopic   0    Molar   0    Multiple   0    Live Births   0          Obstetric Comments   FOB - Arun  Fob #1 - Pregnancy #1                Objective     Vital Signs  /89   Wt 129 kg (285 lb 6.4 oz)   LMP 2023   Estimated body mass index is 52.2 kg/m² as calculated from the following:    Height as of 24: 157.5 cm (62\").    Weight as of this encounter: 129 kg (285 lb 6.4 oz).    Physical Exam  Constitutional:       Appearance: Normal appearance. She is obese.   HENT:      Head: " Normocephalic and atraumatic.   Pulmonary:      Effort: Pulmonary effort is normal.   Musculoskeletal:         General: Normal range of motion.   Neurological:      General: No focal deficit present.      Mental Status: She is alert and oriented to person, place, and time.   Psychiatric:         Mood and Affect: Mood normal.         Behavior: Behavior normal.         Thought Content: Thought content normal.         Judgment: Judgment normal.     Ultrasound Impression:   Vtx, EFW 43rd%, AC 38th%, nl WILY, posterior placenta, limited but normal anatomy, BPP 8/8. UA Dopplers wnl.     Assessment and Plan     Diagnoses and all orders for this visit:    1. Abnormal genetic test during pregnancy (Primary)  Assessment & Plan:  S/p inconclusive NIPT secondary to low fetal fraction.   Anatomy survey within normal limits.       2. Elevated 1 hour GCT - normal 3 hour GTT    3. Gestational hypertension, third trimester  Assessment & Plan:  BP today 133/89. IOL scheduled for Leonardo night.       4. 36 weeks gestation of pregnancy         Follow Up  No follow-ups on file.    I spent 10 minutes caring for the patient on the day of service. This included: obtaining or reviewing a separately obtained medical history, reviewing patient records, performing a medically appropriate exam and/or evaluation, counseling or educating the patient/family/caregiver, ordering medications, labs, and/or procedures and documenting such in the medical record. This does not include time spent on review and interpretation of other tests such as fetal ultrasound or the performance of other procedures such as amniocentesis or CVS.      Becca Thrasher MD  06/11/2024

## 2024-06-14 ENCOUNTER — PREP FOR SURGERY (OUTPATIENT)
Dept: OTHER | Facility: HOSPITAL | Age: 19
End: 2024-06-14
Payer: MEDICAID

## 2024-06-14 ENCOUNTER — ROUTINE PRENATAL (OUTPATIENT)
Dept: OBSTETRICS AND GYNECOLOGY | Facility: CLINIC | Age: 19
End: 2024-06-14
Payer: MEDICAID

## 2024-06-14 ENCOUNTER — LAB (OUTPATIENT)
Dept: LAB | Facility: HOSPITAL | Age: 19
End: 2024-06-14
Payer: MEDICAID

## 2024-06-14 VITALS — WEIGHT: 286 LBS | SYSTOLIC BLOOD PRESSURE: 140 MMHG | BODY MASS INDEX: 52.31 KG/M2 | DIASTOLIC BLOOD PRESSURE: 90 MMHG

## 2024-06-14 DIAGNOSIS — O13.3 GESTATIONAL HYPERTENSION, THIRD TRIMESTER: ICD-10-CM

## 2024-06-14 DIAGNOSIS — O99.210 OBESITY IN PREGNANCY, ANTEPARTUM: ICD-10-CM

## 2024-06-14 DIAGNOSIS — O13.3 GESTATIONAL HYPERTENSION, THIRD TRIMESTER: Primary | ICD-10-CM

## 2024-06-14 DIAGNOSIS — O99.820 GBS (GROUP B STREPTOCOCCUS CARRIER), +RV CULTURE, CURRENTLY PREGNANT: ICD-10-CM

## 2024-06-14 DIAGNOSIS — O99.810 ABNORMAL O'SULLIVAN GLUCOSE CHALLENGE TEST, ANTEPARTUM: ICD-10-CM

## 2024-06-14 DIAGNOSIS — O09.93 HIGH-RISK PREGNANCY IN THIRD TRIMESTER: Primary | ICD-10-CM

## 2024-06-14 DIAGNOSIS — O28.8 NON-REACTIVE NST (NON-STRESS TEST): ICD-10-CM

## 2024-06-14 LAB
ALP SERPL-CCNC: 128 U/L (ref 39–117)
ALT SERPL W P-5'-P-CCNC: 9 U/L (ref 1–33)
AST SERPL-CCNC: 10 U/L (ref 1–32)
BILIRUB SERPL-MCNC: 0.2 MG/DL (ref 0–1.2)
CREAT SERPL-MCNC: 0.57 MG/DL (ref 0.57–1)
DEPRECATED RDW RBC AUTO: 41 FL (ref 37–54)
ERYTHROCYTE [DISTWIDTH] IN BLOOD BY AUTOMATED COUNT: 13.2 % (ref 12.3–15.4)
GLUCOSE UR STRIP-MCNC: NEGATIVE MG/DL
HCT VFR BLD AUTO: 39.8 % (ref 34–46.6)
HGB BLD-MCNC: 13.2 G/DL (ref 12–15.9)
LDH SERPL-CCNC: 219 U/L (ref 135–214)
MCH RBC QN AUTO: 28 PG (ref 26.6–33)
MCHC RBC AUTO-ENTMCNC: 33.2 G/DL (ref 31.5–35.7)
MCV RBC AUTO: 84.3 FL (ref 79–97)
PLATELET # BLD AUTO: 274 10*3/MM3 (ref 140–450)
PMV BLD AUTO: 12.2 FL (ref 6–12)
PROT UR STRIP-MCNC: ABNORMAL MG/DL
RBC # BLD AUTO: 4.72 10*6/MM3 (ref 3.77–5.28)
URATE SERPL-MCNC: 6.3 MG/DL (ref 2.4–5.7)
WBC NRBC COR # BLD AUTO: 11.67 10*3/MM3 (ref 3.4–10.8)

## 2024-06-14 PROCEDURE — 84075 ASSAY ALKALINE PHOSPHATASE: CPT

## 2024-06-14 PROCEDURE — 82247 BILIRUBIN TOTAL: CPT

## 2024-06-14 PROCEDURE — 82565 ASSAY OF CREATININE: CPT

## 2024-06-14 PROCEDURE — 85027 COMPLETE CBC AUTOMATED: CPT

## 2024-06-14 PROCEDURE — 84450 TRANSFERASE (AST) (SGOT): CPT

## 2024-06-14 PROCEDURE — 84550 ASSAY OF BLOOD/URIC ACID: CPT

## 2024-06-14 PROCEDURE — 83615 LACTATE (LD) (LDH) ENZYME: CPT

## 2024-06-14 PROCEDURE — 84460 ALANINE AMINO (ALT) (SGPT): CPT

## 2024-06-14 RX ORDER — IBUPROFEN 600 MG/1
600 TABLET ORAL EVERY 6 HOURS PRN
Status: CANCELLED | OUTPATIENT
Start: 2024-06-14

## 2024-06-14 RX ORDER — OXYCODONE HYDROCHLORIDE AND ACETAMINOPHEN 5; 325 MG/1; MG/1
1 TABLET ORAL EVERY 4 HOURS PRN
Status: CANCELLED | OUTPATIENT
Start: 2024-06-14 | End: 2024-06-24

## 2024-06-14 RX ORDER — LIDOCAINE HYDROCHLORIDE 10 MG/ML
0.5 INJECTION, SOLUTION EPIDURAL; INFILTRATION; INTRACAUDAL; PERINEURAL ONCE AS NEEDED
Status: CANCELLED | OUTPATIENT
Start: 2024-06-14

## 2024-06-14 RX ORDER — SODIUM CHLORIDE 0.9 % (FLUSH) 0.9 %
10 SYRINGE (ML) INJECTION EVERY 12 HOURS SCHEDULED
Status: CANCELLED | OUTPATIENT
Start: 2024-06-14

## 2024-06-14 RX ORDER — METHYLERGONOVINE MALEATE 0.2 MG/ML
200 INJECTION INTRAVENOUS AS NEEDED
Status: CANCELLED | OUTPATIENT
Start: 2024-06-14

## 2024-06-14 RX ORDER — CARBOPROST TROMETHAMINE 250 UG/ML
250 INJECTION, SOLUTION INTRAMUSCULAR AS NEEDED
Status: CANCELLED | OUTPATIENT
Start: 2024-06-14

## 2024-06-14 RX ORDER — ONDANSETRON 4 MG/1
4 TABLET, ORALLY DISINTEGRATING ORAL EVERY 6 HOURS PRN
Status: CANCELLED | OUTPATIENT
Start: 2024-06-14

## 2024-06-14 RX ORDER — PROMETHAZINE HYDROCHLORIDE 12.5 MG/1
12.5 TABLET ORAL EVERY 6 HOURS PRN
Status: CANCELLED | OUTPATIENT
Start: 2024-06-14

## 2024-06-14 RX ORDER — SODIUM CHLORIDE, SODIUM LACTATE, POTASSIUM CHLORIDE, CALCIUM CHLORIDE 600; 310; 30; 20 MG/100ML; MG/100ML; MG/100ML; MG/100ML
125 INJECTION, SOLUTION INTRAVENOUS CONTINUOUS
Status: CANCELLED | OUTPATIENT
Start: 2024-06-14

## 2024-06-14 RX ORDER — NALOXONE HCL 0.4 MG/ML
0.4 VIAL (ML) INJECTION
Status: CANCELLED | OUTPATIENT
Start: 2024-06-14

## 2024-06-14 RX ORDER — MORPHINE SULFATE 1 MG/ML
1 INJECTION, SOLUTION EPIDURAL; INTRATHECAL; INTRAVENOUS EVERY 4 HOURS PRN
Status: CANCELLED | OUTPATIENT
Start: 2024-06-14 | End: 2024-06-19

## 2024-06-14 RX ORDER — SODIUM CHLORIDE 9 MG/ML
40 INJECTION, SOLUTION INTRAVENOUS AS NEEDED
Status: CANCELLED | OUTPATIENT
Start: 2024-06-14

## 2024-06-14 RX ORDER — MISOPROSTOL 200 UG/1
800 TABLET ORAL AS NEEDED
Status: CANCELLED | OUTPATIENT
Start: 2024-06-14

## 2024-06-14 RX ORDER — PENICILLIN G 3000000 [IU]/50ML
3 INJECTION, SOLUTION INTRAVENOUS EVERY 4 HOURS
Status: CANCELLED | OUTPATIENT
Start: 2024-06-14 | End: 2024-06-16

## 2024-06-14 RX ORDER — MORPHINE SULFATE 2 MG/ML
2 INJECTION, SOLUTION INTRAMUSCULAR; INTRAVENOUS EVERY 4 HOURS PRN
Status: CANCELLED | OUTPATIENT
Start: 2024-06-14 | End: 2024-06-19

## 2024-06-14 RX ORDER — OXYTOCIN/0.9 % SODIUM CHLORIDE 30/500 ML
999 PLASTIC BAG, INJECTION (ML) INTRAVENOUS ONCE
Status: CANCELLED | OUTPATIENT
Start: 2024-06-14 | End: 2024-06-14

## 2024-06-14 RX ORDER — MAGNESIUM CARB/ALUMINUM HYDROX 105-160MG
30 TABLET,CHEWABLE ORAL ONCE
Status: CANCELLED | OUTPATIENT
Start: 2024-06-14 | End: 2024-06-14

## 2024-06-14 RX ORDER — OXYTOCIN/0.9 % SODIUM CHLORIDE 30/500 ML
250 PLASTIC BAG, INJECTION (ML) INTRAVENOUS CONTINUOUS
Status: CANCELLED | OUTPATIENT
Start: 2024-06-14 | End: 2024-06-14

## 2024-06-14 RX ORDER — MORPHINE SULFATE 2 MG/ML
2 INJECTION, SOLUTION INTRAMUSCULAR; INTRAVENOUS
Status: CANCELLED | OUTPATIENT
Start: 2024-06-14 | End: 2024-06-24

## 2024-06-14 RX ORDER — ACETAMINOPHEN 325 MG/1
650 TABLET ORAL EVERY 4 HOURS PRN
Status: CANCELLED | OUTPATIENT
Start: 2024-06-14

## 2024-06-14 RX ORDER — SODIUM CHLORIDE 0.9 % (FLUSH) 0.9 %
10 SYRINGE (ML) INJECTION AS NEEDED
Status: CANCELLED | OUTPATIENT
Start: 2024-06-14

## 2024-06-14 RX ORDER — PROMETHAZINE HYDROCHLORIDE 12.5 MG/1
12.5 SUPPOSITORY RECTAL EVERY 6 HOURS PRN
Status: CANCELLED | OUTPATIENT
Start: 2024-06-14

## 2024-06-14 RX ORDER — ONDANSETRON 2 MG/ML
4 INJECTION INTRAMUSCULAR; INTRAVENOUS EVERY 6 HOURS PRN
Status: CANCELLED | OUTPATIENT
Start: 2024-06-14

## 2024-06-14 RX ORDER — HYDROXYZINE HYDROCHLORIDE 25 MG/1
50 TABLET, FILM COATED ORAL NIGHTLY PRN
Status: CANCELLED | OUTPATIENT
Start: 2024-06-14

## 2024-06-14 RX ORDER — OXYTOCIN/0.9 % SODIUM CHLORIDE 30/500 ML
2-24 PLASTIC BAG, INJECTION (ML) INTRAVENOUS
Status: CANCELLED | OUTPATIENT
Start: 2024-06-17

## 2024-06-14 NOTE — H&P
Liliam Romero  : 2005  MRN: [id number]  CSN: [id number]    History and Physical  CC: schedule IOL  Subjective   Liliam Romero is a 19 y.o. year old  with an Estimated Date of Delivery: 24 presenting for induction of labor for gestational hypertension.    Risks of labor induction including prolongation of labor, increased risks for both  section and operative vaginal birth have been discussed at length.     Prenatal care has been with  Dr. Kirk.  It has been complicated by gestational hypertension, GBS carrier, and obesity .    OB History    Para Term  AB Living   1 0 0 0 0 0   SAB IAB Ectopic Molar Multiple Live Births   0 0 0 0 0 0      # Outcome Date GA Lbr Barney/2nd Weight Sex Type Anes PTL Lv   1 Current               Obstetric Comments   FOB - Arun   Fob #1 - Pregnancy #1      Past Medical History:   Diagnosis Date    ADHD     Anxiety     Depression     Gestational hypertension     no meds    OCD (obsessive compulsive disorder)     PTSD (post-traumatic stress disorder)     Scoliosis     Skin rash     PERSISTENT    Trauma      No past surgical history on file.    Current Outpatient Medications:     Blood Pressure Monitoring (Blood Pressure Cuff) misc, Use 1 Device 2 (Two) Times a Day. Please check your BP twice daily and record. Please let us know if you are getting readings above 150 top number or 100 bottom number, Disp: 1 each, Rfl: 0    docusate sodium (Colace) 100 MG capsule, Take 1 capsule by mouth 2 (Two) Times a Day As Needed for Constipation., Disp: 60 capsule, Rfl: 1    famotidine (Pepcid) 20 MG tablet, Take 1 tablet by mouth 2 (Two) Times a Day., Disp: 60 tablet, Rfl: 3    Prenatal Vit-Fe Fumarate-FA (prenatal vitamin 27-0.8) 27-0.8 MG tablet tablet, Take  by mouth Daily., Disp: , Rfl:     No Known Allergies  Social History    Tobacco Use      Smoking status: Former        Packs/day: 0.00        Types: Cigarettes        Quit date:          Years since quittin.4        Passive exposure: Past      Smokeless tobacco: Never      Tobacco comments: Vaping     Review of Systems      Objective   LMP 2023     General: well developed; well nourished  no acute distress   Abdomen: soft, non-tender; no masses  no umbilical or inguinal hernias are present  no hepato-splenomegaly   Cervix: At last prenatal visit - 2 cm / 60 % / -2 / soft / middle   Presentation: At last prenatal visit - cephalic     Prenatal Labs  Lab Results   Component Value Date    HGB 13.2 2024    RUBELLAABIGG 6.67 2023    HEPBSAG Non-Reactive 2023    ABSCRN Negative 2024    IDJ5ROB8 Non-Reactive 2023    HEPCVIRUSABY Non-Reactive 2023     (H) 2024    GGTFASTING 90 2024    ODH2PVMM 181 (H) 2024    ZIM0SBIL 125 2024    DYV9TWLQ 109 2024    STREPGPB positive 2024    URINECX No growth 2024    CHLAMNAA negative 2023    NGONORRHON negative 2023       Current Labs Reviewed   CBC:   Lab Results   Component Value Date    WBC 11.67 (H) 2024    HGB 13.2 2024    HCT 39.8 2024     2024     Pre-eclampsia Panel:   Lab Results   Component Value Date    ALKPHOS 128 (H) 2024    AST 10 2024    ALT 9 2024    BILITOT 0.2 2024     (H) 2024    URICACID 6.3 (H) 2024    CREATININE 0.57 2024          Assessment   IUP with an Estimated Date of Delivery: 24  Induction of labor because of gestational hypertension  Group B strep status: positive  Obesity      Plan   Cervical whaley and Pitocin induction  Amniotomy with descent of presenting part  Okay for epidural at patient request     Belinda Kirk MD

## 2024-06-14 NOTE — ADDENDUM NOTE
Addended by: GENE GRAY on: 6/14/2024 10:54 AM     Modules accepted: Orders     Per Dr. Garcia's note on 1-5-2021. Mr. Foss can take extra Lasix 40 mg as needed. Lasix is prescribed daily, but may take BID to control swelling. Avoid high sodium foods and track his weight daily as well as BP.

## 2024-06-14 NOTE — PROGRESS NOTES
Chief Complaint   Patient presents with    Routine Prenatal Visit     NST started at 0810       HPI: Liliam is a  currently at 36w6d who today reports the following:  Contractions - YES - but less than 4/hour AND no associated change in vaginal discharge; Leaking - No; Vaginal bleeding -  No; Swelling of extremities - No.    ROS:  GI: Nausea - No; Constipation - No; Diarrhea - No    Neuro: Headache - No; Visual change - No      EXAM:  Vitals: See prenatal flowsheet   Abdomen: See prenatal flowsheet   Urine glucose/protein: See prenatal flowsheet   Pelvic: See prenatal flowsheet   MDM:   Impression: Supervision of high risk pregnancy  Obesity in pregnancy  Gestational hypertension without severe features normal blood work 24  GBS (+)  Non-reactive NST   Tests done today: NST - nonreactive   Topics discussed: Continue with PNV's  Prenatal labs reviewed  BPP u/s   Tests scheduled today for her next visit:   U/S for BPP     Non Stress Test      Patient: Liliam Romero  : 2005  MRN: [id number]  CSN: [id number]  Date: 2024    Estimated Date of Delivery: 24  Gestational Age: 36w6d    Indication for NST pregnancy-induced hypertension    Total time > 1 hour                Interpretation    Baseline  beats per minute   Accelerations  One 15 x 15, multiple 10 x 10    Decelerations Absent       Additional Comments Non reactive NST       Recommendations for f/u See above       This note has been electronically signed.    Belinda Kirk MD

## 2024-06-14 NOTE — PROGRESS NOTES
BPP reviewed 8/8    Total score reviewed 8/10    Reviewed fetal movements  PEP and CBC today     Belinda Kirk MD

## 2024-06-15 ENCOUNTER — HOSPITAL ENCOUNTER (INPATIENT)
Facility: HOSPITAL | Age: 19
LOS: 3 days | Discharge: HOME | End: 2024-06-18
Attending: OBSTETRICS & GYNECOLOGY | Admitting: OBSTETRICS & GYNECOLOGY
Payer: MEDICAID

## 2024-06-15 DIAGNOSIS — O13.3 GESTATIONAL HYPERTENSION, THIRD TRIMESTER: ICD-10-CM

## 2024-06-15 LAB
ABO GROUP BLD: NORMAL
ALBUMIN SERPL-MCNC: 3.3 G/DL (ref 3.5–5.2)
ALBUMIN/GLOB SERPL: 1 G/DL
ALP SERPL-CCNC: 142 U/L (ref 39–117)
ALT SERPL W P-5'-P-CCNC: 11 U/L (ref 1–33)
AMPHET+METHAMPHET UR QL: NEGATIVE
AMPHETAMINES UR QL: NEGATIVE
ANION GAP SERPL CALCULATED.3IONS-SCNC: 13 MMOL/L (ref 5–15)
AST SERPL-CCNC: 12 U/L (ref 1–32)
BARBITURATES UR QL SCN: NEGATIVE
BENZODIAZ UR QL SCN: NEGATIVE
BILIRUB SERPL-MCNC: <0.2 MG/DL (ref 0–1.2)
BLD GP AB SCN SERPL QL: NEGATIVE
BUN SERPL-MCNC: 13 MG/DL (ref 6–20)
BUN/CREAT SERPL: 19.1 (ref 7–25)
BUPRENORPHINE SERPL-MCNC: NEGATIVE NG/ML
CALCIUM SPEC-SCNC: 9.5 MG/DL (ref 8.6–10.5)
CANNABINOIDS SERPL QL: POSITIVE
CHLORIDE SERPL-SCNC: 106 MMOL/L (ref 98–107)
CO2 SERPL-SCNC: 20 MMOL/L (ref 22–29)
COCAINE UR QL: NEGATIVE
CREAT SERPL-MCNC: 0.68 MG/DL (ref 0.57–1)
DEPRECATED RDW RBC AUTO: 40.6 FL (ref 37–54)
EGFRCR SERPLBLD CKD-EPI 2021: 127.3 ML/MIN/1.73
ERYTHROCYTE [DISTWIDTH] IN BLOOD BY AUTOMATED COUNT: 13.2 % (ref 12.3–15.4)
FENTANYL UR-MCNC: NEGATIVE NG/ML
GLOBULIN UR ELPH-MCNC: 3.2 GM/DL
GLUCOSE SERPL-MCNC: 94 MG/DL (ref 65–99)
HCT VFR BLD AUTO: 39.4 % (ref 34–46.6)
HGB BLD-MCNC: 13.3 G/DL (ref 12–15.9)
LDH SERPL-CCNC: 295 U/L (ref 135–214)
MCH RBC QN AUTO: 28.2 PG (ref 26.6–33)
MCHC RBC AUTO-ENTMCNC: 33.8 G/DL (ref 31.5–35.7)
MCV RBC AUTO: 83.5 FL (ref 79–97)
METHADONE UR QL SCN: NEGATIVE
OPIATES UR QL: NEGATIVE
OXYCODONE UR QL SCN: NEGATIVE
PCP UR QL SCN: NEGATIVE
PLATELET # BLD AUTO: 286 10*3/MM3 (ref 140–450)
PMV BLD AUTO: 12.1 FL (ref 6–12)
POTASSIUM SERPL-SCNC: 3.8 MMOL/L (ref 3.5–5.2)
PROT SERPL-MCNC: 6.5 G/DL (ref 6–8.5)
RBC # BLD AUTO: 4.72 10*6/MM3 (ref 3.77–5.28)
RH BLD: POSITIVE
SODIUM SERPL-SCNC: 139 MMOL/L (ref 136–145)
T&S EXPIRATION DATE: NORMAL
TRICYCLICS UR QL SCN: NEGATIVE
URATE SERPL-MCNC: 6.8 MG/DL (ref 2.4–5.7)
WBC NRBC COR # BLD AUTO: 14.38 10*3/MM3 (ref 3.4–10.8)

## 2024-06-15 PROCEDURE — 86901 BLOOD TYPING SEROLOGIC RH(D): CPT | Performed by: OBSTETRICS & GYNECOLOGY

## 2024-06-15 PROCEDURE — 86850 RBC ANTIBODY SCREEN: CPT | Performed by: OBSTETRICS & GYNECOLOGY

## 2024-06-15 PROCEDURE — 86780 TREPONEMA PALLIDUM: CPT | Performed by: OBSTETRICS & GYNECOLOGY

## 2024-06-15 PROCEDURE — 84156 ASSAY OF PROTEIN URINE: CPT | Performed by: OBSTETRICS & GYNECOLOGY

## 2024-06-15 PROCEDURE — 86900 BLOOD TYPING SEROLOGIC ABO: CPT | Performed by: OBSTETRICS & GYNECOLOGY

## 2024-06-15 PROCEDURE — 84550 ASSAY OF BLOOD/URIC ACID: CPT | Performed by: OBSTETRICS & GYNECOLOGY

## 2024-06-15 PROCEDURE — 83615 LACTATE (LD) (LDH) ENZYME: CPT | Performed by: OBSTETRICS & GYNECOLOGY

## 2024-06-15 PROCEDURE — 85027 COMPLETE CBC AUTOMATED: CPT | Performed by: OBSTETRICS & GYNECOLOGY

## 2024-06-15 PROCEDURE — 25010000002 PENICILLIN G POTASSIUM PER 600000 UNITS: Performed by: OBSTETRICS & GYNECOLOGY

## 2024-06-15 PROCEDURE — 80053 COMPREHEN METABOLIC PANEL: CPT | Performed by: OBSTETRICS & GYNECOLOGY

## 2024-06-15 PROCEDURE — 59025 FETAL NON-STRESS TEST: CPT | Performed by: OBSTETRICS & GYNECOLOGY

## 2024-06-15 PROCEDURE — 25810000003 LACTATED RINGERS PER 1000 ML: Performed by: OBSTETRICS & GYNECOLOGY

## 2024-06-15 PROCEDURE — 80307 DRUG TEST PRSMV CHEM ANLYZR: CPT | Performed by: OBSTETRICS & GYNECOLOGY

## 2024-06-15 PROCEDURE — G0480 DRUG TEST DEF 1-7 CLASSES: HCPCS | Performed by: OBSTETRICS & GYNECOLOGY

## 2024-06-15 PROCEDURE — 82570 ASSAY OF URINE CREATININE: CPT | Performed by: OBSTETRICS & GYNECOLOGY

## 2024-06-15 PROCEDURE — 99222 1ST HOSP IP/OBS MODERATE 55: CPT | Performed by: OBSTETRICS & GYNECOLOGY

## 2024-06-15 RX ORDER — NALOXONE HCL 0.4 MG/ML
0.4 VIAL (ML) INJECTION
Status: DISCONTINUED | OUTPATIENT
Start: 2024-06-15 | End: 2024-06-16 | Stop reason: HOSPADM

## 2024-06-15 RX ORDER — MORPHINE SULFATE 2 MG/ML
1 INJECTION, SOLUTION INTRAMUSCULAR; INTRAVENOUS EVERY 4 HOURS PRN
Status: DISCONTINUED | OUTPATIENT
Start: 2024-06-15 | End: 2024-06-16 | Stop reason: HOSPADM

## 2024-06-15 RX ORDER — SODIUM CHLORIDE, SODIUM LACTATE, POTASSIUM CHLORIDE, CALCIUM CHLORIDE 600; 310; 30; 20 MG/100ML; MG/100ML; MG/100ML; MG/100ML
125 INJECTION, SOLUTION INTRAVENOUS CONTINUOUS
Status: DISCONTINUED | OUTPATIENT
Start: 2024-06-15 | End: 2024-06-16

## 2024-06-15 RX ORDER — HYDROXYZINE HYDROCHLORIDE 25 MG/1
50 TABLET, FILM COATED ORAL NIGHTLY PRN
Status: DISCONTINUED | OUTPATIENT
Start: 2024-06-15 | End: 2024-06-16 | Stop reason: HOSPADM

## 2024-06-15 RX ORDER — PENICILLIN G 3000000 [IU]/50ML
3 INJECTION, SOLUTION INTRAVENOUS EVERY 4 HOURS
Status: DISCONTINUED | OUTPATIENT
Start: 2024-06-16 | End: 2024-06-16 | Stop reason: HOSPADM

## 2024-06-15 RX ORDER — OXYTOCIN/0.9 % SODIUM CHLORIDE 30/500 ML
999 PLASTIC BAG, INJECTION (ML) INTRAVENOUS ONCE
Status: CANCELLED | OUTPATIENT
Start: 2024-06-15 | End: 2024-06-15

## 2024-06-15 RX ORDER — CARBOPROST TROMETHAMINE 250 UG/ML
250 INJECTION, SOLUTION INTRAMUSCULAR AS NEEDED
Status: CANCELLED | OUTPATIENT
Start: 2024-06-15

## 2024-06-15 RX ORDER — SODIUM CHLORIDE 9 MG/ML
40 INJECTION, SOLUTION INTRAVENOUS AS NEEDED
Status: DISCONTINUED | OUTPATIENT
Start: 2024-06-15 | End: 2024-06-16 | Stop reason: HOSPADM

## 2024-06-15 RX ORDER — ACETAMINOPHEN 325 MG/1
650 TABLET ORAL EVERY 4 HOURS PRN
Status: DISCONTINUED | OUTPATIENT
Start: 2024-06-15 | End: 2024-06-15

## 2024-06-15 RX ORDER — METHYLERGONOVINE MALEATE 0.2 MG/ML
200 INJECTION INTRAVENOUS ONCE AS NEEDED
Status: CANCELLED | OUTPATIENT
Start: 2024-06-15

## 2024-06-15 RX ORDER — FENTANYL CITRATE 50 UG/ML
50 INJECTION, SOLUTION INTRAMUSCULAR; INTRAVENOUS
Status: DISCONTINUED | OUTPATIENT
Start: 2024-06-15 | End: 2024-06-16 | Stop reason: HOSPADM

## 2024-06-15 RX ORDER — SODIUM CHLORIDE 0.9 % (FLUSH) 0.9 %
10 SYRINGE (ML) INJECTION EVERY 12 HOURS SCHEDULED
Status: DISCONTINUED | OUTPATIENT
Start: 2024-06-15 | End: 2024-06-16 | Stop reason: HOSPADM

## 2024-06-15 RX ORDER — ONDANSETRON 2 MG/ML
4 INJECTION INTRAMUSCULAR; INTRAVENOUS EVERY 6 HOURS PRN
Status: DISCONTINUED | OUTPATIENT
Start: 2024-06-15 | End: 2024-06-16 | Stop reason: HOSPADM

## 2024-06-15 RX ORDER — MISOPROSTOL 200 UG/1
800 TABLET ORAL AS NEEDED
Status: CANCELLED | OUTPATIENT
Start: 2024-06-15

## 2024-06-15 RX ORDER — MAGNESIUM CARB/ALUMINUM HYDROX 105-160MG
30 TABLET,CHEWABLE ORAL ONCE
Status: DISCONTINUED | OUTPATIENT
Start: 2024-06-15 | End: 2024-06-16 | Stop reason: HOSPADM

## 2024-06-15 RX ORDER — ONDANSETRON 4 MG/1
4 TABLET, ORALLY DISINTEGRATING ORAL EVERY 6 HOURS PRN
Status: DISCONTINUED | OUTPATIENT
Start: 2024-06-15 | End: 2024-06-16 | Stop reason: HOSPADM

## 2024-06-15 RX ORDER — PENICILLIN G 3000000 [IU]/50ML
3 INJECTION, SOLUTION INTRAVENOUS EVERY 4 HOURS
Status: DISCONTINUED | OUTPATIENT
Start: 2024-06-16 | End: 2024-06-15

## 2024-06-15 RX ORDER — OXYTOCIN/0.9 % SODIUM CHLORIDE 30/500 ML
2-24 PLASTIC BAG, INJECTION (ML) INTRAVENOUS
Status: DISCONTINUED | OUTPATIENT
Start: 2024-06-17 | End: 2024-06-15

## 2024-06-15 RX ORDER — MAGNESIUM CARB/ALUMINUM HYDROX 105-160MG
30 TABLET,CHEWABLE ORAL ONCE
Status: DISCONTINUED | OUTPATIENT
Start: 2024-06-15 | End: 2024-06-15

## 2024-06-15 RX ORDER — OXYTOCIN/0.9 % SODIUM CHLORIDE 30/500 ML
250 PLASTIC BAG, INJECTION (ML) INTRAVENOUS CONTINUOUS
Status: CANCELLED | OUTPATIENT
Start: 2024-06-15 | End: 2024-06-15

## 2024-06-15 RX ORDER — FENTANYL CITRATE 50 UG/ML
100 INJECTION, SOLUTION INTRAMUSCULAR; INTRAVENOUS
Status: DISCONTINUED | OUTPATIENT
Start: 2024-06-15 | End: 2024-06-16 | Stop reason: HOSPADM

## 2024-06-15 RX ORDER — LIDOCAINE HYDROCHLORIDE 10 MG/ML
0.5 INJECTION, SOLUTION EPIDURAL; INFILTRATION; INTRACAUDAL; PERINEURAL ONCE AS NEEDED
Status: DISCONTINUED | OUTPATIENT
Start: 2024-06-15 | End: 2024-06-16 | Stop reason: HOSPADM

## 2024-06-15 RX ORDER — OXYTOCIN/0.9 % SODIUM CHLORIDE 30/500 ML
2-20 PLASTIC BAG, INJECTION (ML) INTRAVENOUS
Status: DISCONTINUED | OUTPATIENT
Start: 2024-06-15 | End: 2024-06-16 | Stop reason: HOSPADM

## 2024-06-15 RX ORDER — SODIUM CHLORIDE 0.9 % (FLUSH) 0.9 %
10 SYRINGE (ML) INJECTION AS NEEDED
Status: DISCONTINUED | OUTPATIENT
Start: 2024-06-15 | End: 2024-06-16 | Stop reason: HOSPADM

## 2024-06-15 RX ORDER — MORPHINE SULFATE 2 MG/ML
2 INJECTION, SOLUTION INTRAMUSCULAR; INTRAVENOUS EVERY 4 HOURS PRN
Status: DISCONTINUED | OUTPATIENT
Start: 2024-06-15 | End: 2024-06-16 | Stop reason: HOSPADM

## 2024-06-15 RX ORDER — LIDOCAINE HYDROCHLORIDE 10 MG/ML
0.5 INJECTION, SOLUTION EPIDURAL; INFILTRATION; INTRACAUDAL; PERINEURAL ONCE AS NEEDED
Status: DISCONTINUED | OUTPATIENT
Start: 2024-06-15 | End: 2024-06-15

## 2024-06-15 RX ORDER — ACETAMINOPHEN 325 MG/1
650 TABLET ORAL EVERY 4 HOURS PRN
Status: DISCONTINUED | OUTPATIENT
Start: 2024-06-15 | End: 2024-06-16 | Stop reason: HOSPADM

## 2024-06-15 RX ADMIN — SODIUM CHLORIDE, POTASSIUM CHLORIDE, SODIUM LACTATE AND CALCIUM CHLORIDE 125 ML/HR: 600; 310; 30; 20 INJECTION, SOLUTION INTRAVENOUS at 22:24

## 2024-06-15 RX ADMIN — SODIUM CHLORIDE 5 MILLION UNITS: 900 INJECTION INTRAVENOUS at 22:30

## 2024-06-16 ENCOUNTER — ANESTHESIA (OUTPATIENT)
Dept: LABOR AND DELIVERY | Facility: HOSPITAL | Age: 19
End: 2024-06-16
Payer: MEDICAID

## 2024-06-16 ENCOUNTER — ANESTHESIA EVENT (OUTPATIENT)
Dept: LABOR AND DELIVERY | Facility: HOSPITAL | Age: 19
End: 2024-06-16
Payer: MEDICAID

## 2024-06-16 LAB
CREAT UR-MCNC: 300.9 MG/DL
PROT ?TM UR-MCNC: 48.3 MG/DL
PROT/CREAT UR: 160.5 MG/G CREA (ref 0–200)
T PALLIDUM IGG SER QL: NORMAL

## 2024-06-16 PROCEDURE — 25010000002 BUPIVACAINE (PF) 0.5 % SOLUTION: Performed by: ANESTHESIOLOGY

## 2024-06-16 PROCEDURE — 25010000002 FENTANYL CITRATE (PF) 50 MCG/ML SOLUTION: Performed by: OBSTETRICS & GYNECOLOGY

## 2024-06-16 PROCEDURE — 94799 UNLISTED PULMONARY SVC/PX: CPT

## 2024-06-16 PROCEDURE — 25810000003 SODIUM CHLORIDE 0.9 % SOLUTION: Performed by: OBSTETRICS & GYNECOLOGY

## 2024-06-16 PROCEDURE — 25010000002 ROPIVACAINE PER 1 MG: Performed by: ANESTHESIOLOGY

## 2024-06-16 PROCEDURE — C1755 CATHETER, INTRASPINAL: HCPCS | Performed by: ANESTHESIOLOGY

## 2024-06-16 PROCEDURE — 25010000002 FENTANYL CITRATE (PF) 100 MCG/2ML SOLUTION: Performed by: ANESTHESIOLOGY

## 2024-06-16 PROCEDURE — 25010000002 OXYTOCIN PER 10 UNITS: Performed by: OBSTETRICS & GYNECOLOGY

## 2024-06-16 PROCEDURE — 59410 OBSTETRICAL CARE: CPT | Performed by: OBSTETRICS & GYNECOLOGY

## 2024-06-16 PROCEDURE — 25010000002 ONDANSETRON PER 1 MG: Performed by: OBSTETRICS & GYNECOLOGY

## 2024-06-16 PROCEDURE — 25810000003 LACTATED RINGERS PER 1000 ML: Performed by: OBSTETRICS & GYNECOLOGY

## 2024-06-16 PROCEDURE — 25010000002 PENICILLIN G POTASSIUM PER 600000 UNITS: Performed by: OBSTETRICS & GYNECOLOGY

## 2024-06-16 PROCEDURE — 25810000003 SODIUM CHLORIDE 0.9 % SOLUTION: Performed by: ANESTHESIOLOGY

## 2024-06-16 RX ORDER — HYDROCORTISONE 25 MG/G
1 CREAM TOPICAL AS NEEDED
Status: DISCONTINUED | OUTPATIENT
Start: 2024-06-16 | End: 2024-06-18 | Stop reason: HOSPADM

## 2024-06-16 RX ORDER — EPHEDRINE SULFATE 5 MG/ML
10 INJECTION INTRAVENOUS
Status: DISCONTINUED | OUTPATIENT
Start: 2024-06-16 | End: 2024-06-16 | Stop reason: HOSPADM

## 2024-06-16 RX ORDER — OXYTOCIN/0.9 % SODIUM CHLORIDE 30/500 ML
125 PLASTIC BAG, INJECTION (ML) INTRAVENOUS ONCE AS NEEDED
Status: DISCONTINUED | OUTPATIENT
Start: 2024-06-16 | End: 2024-06-18 | Stop reason: HOSPADM

## 2024-06-16 RX ORDER — METHYLERGONOVINE MALEATE 0.2 MG/ML
200 INJECTION INTRAVENOUS AS NEEDED
Status: DISCONTINUED | OUTPATIENT
Start: 2024-06-16 | End: 2024-06-16 | Stop reason: HOSPADM

## 2024-06-16 RX ORDER — OXYTOCIN/0.9 % SODIUM CHLORIDE 30/500 ML
250 PLASTIC BAG, INJECTION (ML) INTRAVENOUS CONTINUOUS
Status: DISCONTINUED | OUTPATIENT
Start: 2024-06-16 | End: 2024-06-16

## 2024-06-16 RX ORDER — ONDANSETRON 2 MG/ML
4 INJECTION INTRAMUSCULAR; INTRAVENOUS EVERY 6 HOURS PRN
Status: DISCONTINUED | OUTPATIENT
Start: 2024-06-16 | End: 2024-06-16 | Stop reason: HOSPADM

## 2024-06-16 RX ORDER — FAMOTIDINE 10 MG/ML
20 INJECTION, SOLUTION INTRAVENOUS ONCE AS NEEDED
Status: DISCONTINUED | OUTPATIENT
Start: 2024-06-16 | End: 2024-06-16 | Stop reason: HOSPADM

## 2024-06-16 RX ORDER — ENOXAPARIN SODIUM 100 MG/ML
40 INJECTION SUBCUTANEOUS DAILY
Status: DISCONTINUED | OUTPATIENT
Start: 2024-06-17 | End: 2024-06-18 | Stop reason: HOSPADM

## 2024-06-16 RX ORDER — DIPHENHYDRAMINE HYDROCHLORIDE 50 MG/ML
12.5 INJECTION INTRAMUSCULAR; INTRAVENOUS EVERY 8 HOURS PRN
Status: DISCONTINUED | OUTPATIENT
Start: 2024-06-16 | End: 2024-06-16 | Stop reason: HOSPADM

## 2024-06-16 RX ORDER — MISOPROSTOL 200 UG/1
800 TABLET ORAL AS NEEDED
Status: DISCONTINUED | OUTPATIENT
Start: 2024-06-16 | End: 2024-06-16 | Stop reason: HOSPADM

## 2024-06-16 RX ORDER — LIDOCAINE HYDROCHLORIDE AND EPINEPHRINE 15; 5 MG/ML; UG/ML
INJECTION, SOLUTION EPIDURAL AS NEEDED
Status: DISCONTINUED | OUTPATIENT
Start: 2024-06-16 | End: 2024-06-16 | Stop reason: SURG

## 2024-06-16 RX ORDER — BUPIVACAINE HYDROCHLORIDE 5 MG/ML
INJECTION, SOLUTION EPIDURAL; INTRACAUDAL AS NEEDED
Status: DISCONTINUED | OUTPATIENT
Start: 2024-06-16 | End: 2024-06-16 | Stop reason: SURG

## 2024-06-16 RX ORDER — ONDANSETRON 2 MG/ML
4 INJECTION INTRAMUSCULAR; INTRAVENOUS ONCE AS NEEDED
Status: DISCONTINUED | OUTPATIENT
Start: 2024-06-16 | End: 2024-06-16 | Stop reason: HOSPADM

## 2024-06-16 RX ORDER — CARBOPROST TROMETHAMINE 250 UG/ML
250 INJECTION, SOLUTION INTRAMUSCULAR AS NEEDED
Status: DISCONTINUED | OUTPATIENT
Start: 2024-06-16 | End: 2024-06-16 | Stop reason: HOSPADM

## 2024-06-16 RX ORDER — PROMETHAZINE HYDROCHLORIDE 12.5 MG/1
12.5 SUPPOSITORY RECTAL EVERY 6 HOURS PRN
Status: DISCONTINUED | OUTPATIENT
Start: 2024-06-16 | End: 2024-06-16 | Stop reason: HOSPADM

## 2024-06-16 RX ORDER — DOCUSATE SODIUM 100 MG/1
100 CAPSULE, LIQUID FILLED ORAL 2 TIMES DAILY
Status: DISCONTINUED | OUTPATIENT
Start: 2024-06-16 | End: 2024-06-18 | Stop reason: HOSPADM

## 2024-06-16 RX ORDER — ONDANSETRON 4 MG/1
4 TABLET, ORALLY DISINTEGRATING ORAL EVERY 6 HOURS PRN
Status: DISCONTINUED | OUTPATIENT
Start: 2024-06-16 | End: 2024-06-16 | Stop reason: HOSPADM

## 2024-06-16 RX ORDER — IBUPROFEN 600 MG/1
600 TABLET ORAL EVERY 6 HOURS PRN
Status: DISCONTINUED | OUTPATIENT
Start: 2024-06-16 | End: 2024-06-16 | Stop reason: HOSPADM

## 2024-06-16 RX ORDER — ACETAMINOPHEN 325 MG/1
650 TABLET ORAL EVERY 6 HOURS PRN
Status: DISCONTINUED | OUTPATIENT
Start: 2024-06-16 | End: 2024-06-18 | Stop reason: HOSPADM

## 2024-06-16 RX ORDER — OXYCODONE HYDROCHLORIDE AND ACETAMINOPHEN 5; 325 MG/1; MG/1
1 TABLET ORAL EVERY 4 HOURS PRN
Status: DISCONTINUED | OUTPATIENT
Start: 2024-06-16 | End: 2024-06-16 | Stop reason: HOSPADM

## 2024-06-16 RX ORDER — SODIUM CHLORIDE 0.9 % (FLUSH) 0.9 %
1-10 SYRINGE (ML) INJECTION AS NEEDED
Status: DISCONTINUED | OUTPATIENT
Start: 2024-06-16 | End: 2024-06-18 | Stop reason: HOSPADM

## 2024-06-16 RX ORDER — METOCLOPRAMIDE HYDROCHLORIDE 5 MG/ML
10 INJECTION INTRAMUSCULAR; INTRAVENOUS ONCE AS NEEDED
Status: DISCONTINUED | OUTPATIENT
Start: 2024-06-16 | End: 2024-06-16 | Stop reason: HOSPADM

## 2024-06-16 RX ORDER — MORPHINE SULFATE 2 MG/ML
2 INJECTION, SOLUTION INTRAMUSCULAR; INTRAVENOUS
Status: DISCONTINUED | OUTPATIENT
Start: 2024-06-16 | End: 2024-06-16 | Stop reason: HOSPADM

## 2024-06-16 RX ORDER — HYDROCODONE BITARTRATE AND ACETAMINOPHEN 10; 325 MG/1; MG/1
1 TABLET ORAL EVERY 4 HOURS PRN
Status: DISCONTINUED | OUTPATIENT
Start: 2024-06-16 | End: 2024-06-18 | Stop reason: HOSPADM

## 2024-06-16 RX ORDER — HYDROCODONE BITARTRATE AND ACETAMINOPHEN 5; 325 MG/1; MG/1
1 TABLET ORAL EVERY 8 HOURS PRN
Status: DISCONTINUED | OUTPATIENT
Start: 2024-06-16 | End: 2024-06-18 | Stop reason: HOSPADM

## 2024-06-16 RX ORDER — PROMETHAZINE HYDROCHLORIDE 12.5 MG/1
12.5 TABLET ORAL EVERY 6 HOURS PRN
Status: DISCONTINUED | OUTPATIENT
Start: 2024-06-16 | End: 2024-06-16 | Stop reason: HOSPADM

## 2024-06-16 RX ORDER — CITRIC ACID/SODIUM CITRATE 334-500MG
30 SOLUTION, ORAL ORAL ONCE
Status: DISCONTINUED | OUTPATIENT
Start: 2024-06-16 | End: 2024-06-16 | Stop reason: HOSPADM

## 2024-06-16 RX ORDER — OXYTOCIN/0.9 % SODIUM CHLORIDE 30/500 ML
999 PLASTIC BAG, INJECTION (ML) INTRAVENOUS ONCE
Status: COMPLETED | OUTPATIENT
Start: 2024-06-16 | End: 2024-06-16

## 2024-06-16 RX ORDER — IBUPROFEN 600 MG/1
600 TABLET ORAL EVERY 6 HOURS SCHEDULED
Status: DISCONTINUED | OUTPATIENT
Start: 2024-06-16 | End: 2024-06-18 | Stop reason: HOSPADM

## 2024-06-16 RX ORDER — BISACODYL 10 MG
10 SUPPOSITORY, RECTAL RECTAL DAILY PRN
Status: DISCONTINUED | OUTPATIENT
Start: 2024-06-17 | End: 2024-06-18 | Stop reason: HOSPADM

## 2024-06-16 RX ADMIN — PENICILLIN G 3 MILLION UNITS: 3000000 INJECTION, SOLUTION INTRAVENOUS at 06:53

## 2024-06-16 RX ADMIN — OXYTOCIN 2 MILLI-UNITS/MIN: 10 INJECTION INTRAVENOUS at 00:42

## 2024-06-16 RX ADMIN — ONDANSETRON 4 MG: 2 INJECTION INTRAMUSCULAR; INTRAVENOUS at 03:46

## 2024-06-16 RX ADMIN — BUPIVACAINE HYDROCHLORIDE 6 MG: 5 INJECTION, SOLUTION EPIDURAL; INTRACAUDAL at 02:55

## 2024-06-16 RX ADMIN — PENICILLIN G 3 MILLION UNITS: 3000000 INJECTION, SOLUTION INTRAVENOUS at 01:47

## 2024-06-16 RX ADMIN — Medication 1 APPLICATION: at 13:57

## 2024-06-16 RX ADMIN — OXYTOCIN 250 ML/HR: 10 INJECTION INTRAVENOUS at 11:30

## 2024-06-16 RX ADMIN — IBUPROFEN 600 MG: 600 TABLET, FILM COATED ORAL at 15:01

## 2024-06-16 RX ADMIN — OXYTOCIN 999 ML/HR: 10 INJECTION INTRAVENOUS at 11:20

## 2024-06-16 RX ADMIN — Medication: at 13:57

## 2024-06-16 RX ADMIN — FENTANYL CITRATE 100 MCG: 50 INJECTION, SOLUTION INTRAMUSCULAR; INTRAVENOUS at 01:47

## 2024-06-16 RX ADMIN — BENZOCAINE 1 APPLICATION: 5.6 OINTMENT TOPICAL at 13:58

## 2024-06-16 RX ADMIN — LIDOCAINE HYDROCHLORIDE AND EPINEPHRINE 3 ML: 15; 5 INJECTION, SOLUTION EPIDURAL at 02:55

## 2024-06-16 RX ADMIN — SODIUM CHLORIDE, POTASSIUM CHLORIDE, SODIUM LACTATE AND CALCIUM CHLORIDE 125 ML/HR: 600; 310; 30; 20 INJECTION, SOLUTION INTRAVENOUS at 07:58

## 2024-06-16 RX ADMIN — WITCH HAZEL: 500 SOLUTION RECTAL; TOPICAL at 13:57

## 2024-06-16 NOTE — PROGRESS NOTES
Patient feeling contractions but still pretty comfortable    FHT Cat 1  Ctx's not tracing well    CE 6/80/-1, post  Leaking clear fluid    IUPC and FSE placed without difficulty    Last growth 42%ile    Preelampsia Labs:    Results from last 7 days   Lab Units 06/15/24  2228   WBC 10*3/mm3 14.38*   HEMOGLOBIN g/dL 13.3   HEMATOCRIT % 39.4   PLATELETS 10*3/mm3 286   POTASSIUM mmol/L 3.8   ALT (SGPT) U/L 11   AST (SGOT) U/L 12   LDH U/L 295*   CREATININE mg/dL 0.68   BILIRUBIN mg/dL <0.2   URIC ACID mg/dL 6.8*      18yo G1 @ 37w1d with gestational hypertension, BMI 52, elevated 1hr GTT, normal 3hr  admitted in labor    -augment and deliver  - GBS pos - on abx    Katelin Harris MD  06/16/24  01:27 EDT

## 2024-06-16 NOTE — L&D DELIVERY NOTE
2024    Patient:Liliam Romero    MR#:[id number]    Vaginal Delivery Note  19 y.o. yo female  at 37w1d      Gestational hypertension, third trimester    Elevated 1 hour GCT - normal 3 hour GTT     premature rupture of membranes (PPROM) with onset of labor within 24 hours of rupture in third trimester, antepartum       Delivery     Delivery: Vaginal, Vacuum (Extractor)     YOB: 2026    Time of Birth: 11:15 AM      Anesthesia: Epidural     Delivering clinician: Katelin Harris    Forceps?   No   Vacuum? Yes  Vacuum Delivery  Vacuum attempted?      Vacuum indication:     Vacuum type: Kiwi    Application location:     First Attempt     Time applied:     Time removed:     Second Attempt    Time applied:     Time removed:     Third Attempt    Time applied:     Time removed:     Number of pulls:     Number of pop-offs:     Low-end pressure range:     High-end pressure range:     Total application time:     Applied by:     Failed?         Shoulder dystocia present: No          Infant    Findings: male  infant     Infant observations: Weight: 2810 g (6 lb 3.1 oz)     Observations/Comments:        Apgars: 8  @ 1 minute /    9  @ 5 minutes         Placenta, Cord, and Fluid    Placenta delivered  Spontaneous  at  2024 11:20 AM     Cord:   present.   Nuchal Cord?  yes; Number of nuchal loops present:   2   Cord blood obtained:     Cord gases obtained:      Cord gas results: Pending         Repair    Episiotomy: No   Lacerations: Yes  Laceration Information  Laceration Repaired?   Perineal:       Periurethral:       Labial: bilateral  Yes    Sulcus:       Vaginal:       Cervical:         Suture used for repair: 3-0 Vicryl, Rapide, single interrupted for left labial     Estimated Blood Loss:  200 mls.         Complications  none    Disposition  Mother to Mother Baby/Postpartum  in stable condition currently.  Baby to remains with mom  in stable condition  currently.    Description of Delivery  Patient pushed well x 1-1.5hr and as she was , fetal heart rate bradycardia to 50's and was sustained and so as she did not push out with that contraction, decision was made to do vacuum extraction.  She was consented quickly and DRT, charge called.  With the next contraction, baby delivered easily from OA presentation.  There was a nuchal x 2, reduced after delivery.  Shoulders delivered easily. Nose and mouth was bulb suctioned and baby was placed on mother's abdomen.  Cord was milked,  clamped and cut.  Left labia was reapproximated with single interrupted 3-0 vicryl rapide.  Placenta delivered easily and with pitocin bolus, there was no uterine atony.       Katelin Harris MD  24  11:36 EDT

## 2024-06-16 NOTE — LACTATION NOTE
06/16/24 1510   Maternal Information   Date of Referral 06/16/24   Person Making Referral lactation consultant   Maternal Reason for Referral no prior breastfeeding experience   Infant Reason for Referral 35-37 weeks gestation   Maternal Assessment   Left Nipple Symptoms nontender;leaking (lactating)   Right Nipple Symptoms nontender;leaking (lactating)   Maternal Infant Feeding   Maternal Emotional State anxious;distracted     Courtesy visit for newly postpartum couplet. MOB  baby one time with assistance from PCT. Infant latched well and nursed for 20 min, RN and PCT report MOB leaking colostrum and baby gulping at breast. MOB has decided she would prefer to formula feed baby. Declines interest in pumping at this time. Encouraged to call if plans change.

## 2024-06-16 NOTE — ANESTHESIA PREPROCEDURE EVALUATION
Anesthesia Evaluation     Patient summary reviewed and Nursing notes reviewed                Airway   Mallampati: I  TM distance: >3 FB  Neck ROM: full  No difficulty expected  Dental - normal exam     Pulmonary - negative pulmonary ROS and normal exam   Cardiovascular - negative cardio ROS and normal exam        Neuro/Psych- negative ROS  GI/Hepatic/Renal/Endo    (+) obesity, morbid obesity    Musculoskeletal (-) negative ROS    Abdominal   (+) obese    Bowel sounds: normal.   Substance History - negative use     OB/GYN    (+) Pregnant, pregnancy induced hypertension        Other                    Anesthesia Plan    ASA 2 - emergent     epidural       Anesthetic plan, risks, benefits, and alternatives have been provided, discussed and informed consent has been obtained with: patient.    Use of blood products discussed with patient .    Plan discussed with attending.    CODE STATUS:    Level Of Support Discussed With: Patient  Code Status (Patient has no pulse and is not breathing): CPR (Attempt to Resuscitate)  Medical Interventions (Patient has pulse or is breathing): Full Support

## 2024-06-16 NOTE — ANESTHESIA PROCEDURE NOTES
Labor Epidural      Patient reassessed immediately prior to procedure    Patient location during procedure: OB  Start Time: 6/16/2024 2:00 AM  Stop Time: 6/16/2024 3:15 AM  Performed By  Anesthesiologist: Omar Lilly MD  Preanesthetic Checklist  Completed: patient identified, IV checked, site marked, risks and benefits discussed, surgical consent, monitors and equipment checked, pre-op evaluation and timeout performed  Additional Notes  Dpe difficult placement  Prep:  Pt Position:sitting  Sterile Tech:cap, gloves, mask and sterile barrier  Prep:povidone-iodine 7.5% surgical scrub  Monitoring:blood pressure monitoring  Epidural Block Procedure:  Approach:midline  Guidance:landmark technique  Location:L3-L4  Needle Type:Tuohy  Needle Gauge:17 G  Loss of Resistance Medium: air  Loss of Resistance: 9cm  Cath Depth at skin:15 cm  Paresthesia: none  Aspiration:negative  Test Dose:negative  Number of Attempts: 3  Post Assessment:  Dressing:occlusive dressing applied and secured with tape  Pt Tolerance:patient tolerated the procedure well with no apparent complications  Complications:no

## 2024-06-16 NOTE — H&P
Liliam Romero  2005  [id number]  [id number]    CC: leaking  HPI:  Patient is 19 y.o. female   currently at 37w0d presents with c/o leaking, first noticed this am, several more trickles thru the day.  Pt denies bleeding, but is having some contractions.  PNC comp by gest HTN, tob use and super obesity.    PMH:  Current meds: PNV  Illnesses: constipation (resolved), anxiety/depression, PTSD  Surgeries: T and A, oral surg  Allergies: NKDA    Past OB History:       OB History    Para Term  AB Living   1 0 0 0 0 0   SAB IAB Ectopic Molar Multiple Live Births   0 0 0 0 0 0      # Outcome Date GA Lbr Barney/2nd Weight Sex Type Anes PTL Lv   1 Current               Obstetric Comments   FOB - Arun   Fob #1 - Pregnancy #1             SH: tob vapes with nicotine and smokes , EtOH neg, drugs THC (last use 2-3 d ago)      General ROS: leaking, contractions, rash, edema.   All other systems reviewed and are negative.      Physical Examination: General appearance - alert, well appearing, and in no distress  Vital signs - /83   Pulse 80   Temp 98.1 °F (36.7 °C) (Oral)   Resp 18   LMP 2023   SpO2 94%   HEENT: normocephalic, atraumatic,oropharynx clear, appearance of ears and nose normal  Neck - supple, no significant adenopathy, no thyromegaly  Lymphatics - no palpable lymphadenopathy in the neck or groin, no hepatosplenomegaly  Chest - clear to auscultation, no wheezes, rales or rhonchi, respiratory effort non-labored  Heart - normal rate, regular rhythm, no murmurs, rubs, clicks or gallops, no JVD, trace lower extremity edema  Abdomen - soft, nontender, nondistended, no masses, no hepatosplenomegaly  no rebound tenderness noted, bowel sounds normal  Vaginal Exam: 2/80%/-1, no blood in vault, amnisure pos,external genitalia normal with   vitiligo  Extremities - trace pedal edema noted, no calf tend  Skin -warm and dry, normal coloration and turgor, no rashes, no suspicious skin  lesions noted      Fetal monitoring: indication leaking, onset 2055, offset 2130, baseline 130, mod BTB variability, multiple accels (15 X 15), no decels, occas contractions, interpretation reactive NST    Radiology     Assessment 1)IUP 37 0/7 weeks   2)early labor with SROM   3)gest HTN   4)super obesity   5)tob use    Plan 1)admit   2)cervical ripening, low dose pitocin, GBS antibiotics    Alex Rose MD  6/15/2024  21:26 EDT

## 2024-06-16 NOTE — PROCEDURES
Transcervical whaley placed per physician request.  FHT's class 1.  Patient tolerated well. 24 Citizen of [place], 60ml.    Alex Rose MD  6/15/2024  22:52 EDT

## 2024-06-17 ENCOUNTER — PATIENT OUTREACH (OUTPATIENT)
Dept: LABOR AND DELIVERY | Facility: HOSPITAL | Age: 19
End: 2024-06-17
Payer: MEDICAID

## 2024-06-17 LAB
BASOPHILS # BLD AUTO: 0.04 10*3/MM3 (ref 0–0.2)
BASOPHILS NFR BLD AUTO: 0.3 % (ref 0–1.5)
DEPRECATED RDW RBC AUTO: 42.5 FL (ref 37–54)
EOSINOPHIL # BLD AUTO: 0.12 10*3/MM3 (ref 0–0.4)
EOSINOPHIL NFR BLD AUTO: 0.8 % (ref 0.3–6.2)
ERYTHROCYTE [DISTWIDTH] IN BLOOD BY AUTOMATED COUNT: 13.5 % (ref 12.3–15.4)
HCT VFR BLD AUTO: 37 % (ref 34–46.6)
HGB BLD-MCNC: 12 G/DL (ref 12–15.9)
IMM GRANULOCYTES # BLD AUTO: 0.07 10*3/MM3 (ref 0–0.05)
IMM GRANULOCYTES NFR BLD AUTO: 0.5 % (ref 0–0.5)
LYMPHOCYTES # BLD AUTO: 3.21 10*3/MM3 (ref 0.7–3.1)
LYMPHOCYTES NFR BLD AUTO: 21.3 % (ref 19.6–45.3)
MCH RBC QN AUTO: 28 PG (ref 26.6–33)
MCHC RBC AUTO-ENTMCNC: 32.4 G/DL (ref 31.5–35.7)
MCV RBC AUTO: 86.4 FL (ref 79–97)
MONOCYTES # BLD AUTO: 1.19 10*3/MM3 (ref 0.1–0.9)
MONOCYTES NFR BLD AUTO: 7.9 % (ref 5–12)
NEUTROPHILS NFR BLD AUTO: 10.45 10*3/MM3 (ref 1.7–7)
NEUTROPHILS NFR BLD AUTO: 69.2 % (ref 42.7–76)
NRBC BLD AUTO-RTO: 0 /100 WBC (ref 0–0.2)
PLATELET # BLD AUTO: 218 10*3/MM3 (ref 140–450)
PMV BLD AUTO: 11.9 FL (ref 6–12)
POC AMNISURE: POSITIVE
RBC # BLD AUTO: 4.28 10*6/MM3 (ref 3.77–5.28)
WBC NRBC COR # BLD AUTO: 15.08 10*3/MM3 (ref 3.4–10.8)

## 2024-06-17 PROCEDURE — 85025 COMPLETE CBC W/AUTO DIFF WBC: CPT | Performed by: OBSTETRICS & GYNECOLOGY

## 2024-06-17 PROCEDURE — 0503F POSTPARTUM CARE VISIT: CPT | Performed by: OBSTETRICS & GYNECOLOGY

## 2024-06-17 PROCEDURE — 25010000002 ENOXAPARIN PER 10 MG: Performed by: OBSTETRICS & GYNECOLOGY

## 2024-06-17 PROCEDURE — 84112 EVAL AMNIOTIC FLUID PROTEIN: CPT | Performed by: OBSTETRICS & GYNECOLOGY

## 2024-06-17 RX ADMIN — IBUPROFEN 600 MG: 600 TABLET, FILM COATED ORAL at 12:23

## 2024-06-17 RX ADMIN — DOCUSATE SODIUM 100 MG: 100 CAPSULE, LIQUID FILLED ORAL at 20:57

## 2024-06-17 RX ADMIN — IBUPROFEN 600 MG: 600 TABLET, FILM COATED ORAL at 18:00

## 2024-06-17 RX ADMIN — IBUPROFEN 600 MG: 600 TABLET, FILM COATED ORAL at 23:28

## 2024-06-17 RX ADMIN — ACETAMINOPHEN 650 MG: 325 TABLET ORAL at 08:35

## 2024-06-17 RX ADMIN — ENOXAPARIN SODIUM 40 MG: 40 INJECTION SUBCUTANEOUS at 03:00

## 2024-06-17 RX ADMIN — HYDROCODONE BITARTRATE AND ACETAMINOPHEN 1 TABLET: 10; 325 TABLET ORAL at 21:26

## 2024-06-17 RX ADMIN — DOCUSATE SODIUM 100 MG: 100 CAPSULE, LIQUID FILLED ORAL at 08:35

## 2024-06-17 NOTE — ANESTHESIA POSTPROCEDURE EVALUATION
Patient: Liliam Romero    Procedure Summary       Date: 06/16/24 Room / Location:     Anesthesia Start: 0240 Anesthesia Stop: 1120    Procedure: LABOR ANALGESIA Diagnosis:     Scheduled Providers:  Provider: Omar Lilly MD    Anesthesia Type: epidural ASA Status: 2 - Emergent            Anesthesia Type: epidural    Vitals  Vitals Value Taken Time   /68 06/17/24 0820   Temp 97.9 °F (36.6 °C) 06/17/24 0820   Pulse 82 06/17/24 0820   Resp 18 06/17/24 0820   SpO2             Post Anesthesia Care and Evaluation    Patient location during evaluation: bedside  Patient participation: complete - patient participated  Level of consciousness: awake and alert  Pain management: adequate    Airway patency: patent  Anesthetic complications: No anesthetic complications    Cardiovascular status: acceptable  Respiratory status: acceptable  Hydration status: acceptable  Post Neuraxial Block status: Motor and sensory function returned to baseline and No signs or symptoms of PDPH

## 2024-06-17 NOTE — CASE MANAGEMENT/SOCIAL WORK
Continued Stay Note  [facility]     Patient Name: Liliam Romero  MRN: [id number]  Today's Date: 6/17/2024    Admit Date: 6/15/2024    Plan: plans to d/c to [facility]   Discharge Plan       Row Name 06/17/24 1346       Plan    Plan plans to d/c to [facility]    Plan Comments Received consult re: food insecurities. Met with pt and FOB. They report they have been living with an aunt and uncle. Report aunt just let them know that they need to find new housing. Neither are currently employed. Pt receives Medicaid benefits. States she was denied [employer] benefits. Discussed WIC and Hands program. Pt was not sure about Hands program but did stat she will sign form for Hands. Pt states they have applied for low income housing in several [facility]. MSW encouraged them to apply in [facility] as the waiting list just opened up in June. Pt states she is willing to stay at [facility]. FOB states he understands he cannot stay there. He reports he has a lead on a job. Pt does have a car seat. She plans to bottle feed. Encouraged her to call [facility] asap.                   Discharge Codes    No documentation.                       OMID Sharp

## 2024-06-17 NOTE — PROGRESS NOTES
Na[place]  Liliam Romero  : 2005  MRN: [id number]  CSN: [id number]    Postpartum Day #1  CC: routine postpartum care   Subjective   Her pain is well controlled.  Vaginal bleeding is less than a normal period.       Objective     Min/max vitals past 24 hours:   Temp  Min: 98 °F (36.7 °C)  Max: 98.6 °F (37 °C)  BP  Min: 100/60  Max: 129/69  Pulse  Min: 74  Max: 97  Resp  Min: 18  Max: 20        Abdomen: soft, non-tender; bowel sounds normal; no masses   fundus firm and non-tender   Pelvic: deferred     Results from last 7 days   Lab Units 24  0504 06/15/24  2228 24  1119   WBC 10*3/mm3 15.08* 14.38* 11.67*   HEMOGLOBIN g/dL 12.0 13.3 13.2   HEMATOCRIT % 37.0 39.4 39.8   PLATELETS 10*3/mm3 218 286 274     Lab Results   Component Value Date    RH Positive 06/15/2024    HEPBSAG Non-Reactive 2023        Assessment   Postpartum Day #1 S/P vaginal delivery  Doing well  Gestational hypertension - normotensive today      Plan   Continue routine postpartum care  Defer circumcision - baby to see pediatric urology    Belinda Kirk MD  2024  08:14 EDT

## 2024-06-17 NOTE — PAYOR COMM NOTE
"Liliam Romero (19 y.o. Female)     [facility] Ref#[id number]     Delivery Information.    From:Desi Arriaga LPN, Utilization Review  Phone #[phone]  Fax #[phone]        Date of Birth   2005    Social Security Number       Address   [place] [address]    Home Phone   [phone]    MRN   [id number]       Orthodoxy   None    Marital Status   Single                            Admission Date   6/15/24    Admission Type   Elective    Admitting Provider   Belinda Kirk MD    Attending Provider   Belinda Kirk MD    Department, Room/Bed   [facility] MOTHER BABY 4B, N427/1       Discharge Date       Discharge Disposition       Discharge Destination                                 Attending Provider: Belinda Kirk MD    Allergies: No Known Allergies    Isolation: None   Infection: None   Code Status: CPR    Ht: 157.5 cm (62\")   Wt: 130 kg (286 lb)    Admission Cmt: None   Principal Problem: Gestational hypertension, third trimester [O13.3]                   Active Insurance as of 6/15/2024       Primary Coverage       Payor Plan Insurance Group Employer/Plan Group    [facility] COMMUNITY PLAN [facility] COMMUNITY PLAN [facility] [id number]       Payor Plan Address Payor Plan Phone Number Payor Plan Fax Number Effective Dates    [address]   12/1/2023 - None Entered    [facility] [address]         Subscriber Name Subscriber Birth Date Member ID       ROMEROLILIAM CASILLAS 2005 [id number]                     Emergency Contacts        (Rel.) Home Phone Work Phone Mobile Phone    NORI LEIJA (Relative) [phone] -- [phone]    DIMPLE STORY (Significant Other) -- -- [phone]    MAYRA SAXENA (Other) -- -- [phone]              Insurance Information                  [facility] COMMUNITY PLAN [facility] PLAN [facility] Phone: --    Subscriber: Liliam Romero Subscriber#: [id number]    Group#: [id number] Precert#: --           "   History & Physical        High, Alex DEL ROSARIO MD at 06/15/24 2126          Liliam Romero  2005  [id number]  [id number]    CC: leaking  HPI:  Patient is 19 y.o. female   currently at 37w0d presents with c/o leaking, first noticed this am, several more trickles thru the day.  Pt denies bleeding, but is having some contractions.  PNC comp by gest HTN, tob use and super obesity.    PMH:  Current meds: PNV  Illnesses: constipation (resolved), anxiety/depression, PTSD  Surgeries: T and A, oral surg  Allergies: NKDA    Past OB History:       OB History    Para Term  AB Living   1 0 0 0 0 0   SAB IAB Ectopic Molar Multiple Live Births   0 0 0 0 0 0      # Outcome Date GA Lbr Barney/2nd Weight Sex Type Anes PTL Lv   1 Current               Obstetric Comments   FOB - Arun   Fob #1 - Pregnancy #1             SH: tob vapes with nicotine and smokes , EtOH neg, drugs THC (last use 2-3 d ago)      General ROS: leaking, contractions, rash, edema.   All other systems reviewed and are negative.      Physical Examination: General appearance - alert, well appearing, and in no distress  Vital signs - /83   Pulse 80   Temp 98.1 °F (36.7 °C) (Oral)   Resp 18   LMP 2023   SpO2 94%   HEENT: normocephalic, atraumatic,oropharynx clear, appearance of ears and nose normal  Neck - supple, no significant adenopathy, no thyromegaly  Lymphatics - no palpable lymphadenopathy in the neck or groin, no hepatosplenomegaly  Chest - clear to auscultation, no wheezes, rales or rhonchi, respiratory effort non-labored  Heart - normal rate, regular rhythm, no murmurs, rubs, clicks or gallops, no JVD, trace lower extremity edema  Abdomen - soft, nontender, nondistended, no masses, no hepatosplenomegaly  no rebound tenderness noted, bowel sounds normal  Vaginal Exam: 280%/-1, no blood in vault, amnisure pos,external genitalia normal with   vitiligo  Extremities - trace pedal edema noted, no calf tend  Skin -warm  and dry, normal coloration and turgor, no rashes, no suspicious skin lesions noted      Fetal monitoring: indication leaking, onset 2055, offset 2130, baseline 130, mod BTB variability, multiple accels (15 X 15), no decels, occas contractions, interpretation reactive NST    Radiology     Assessment 1)IUP 37 0/7 weeks   2)early labor with SROM   3)gest HTN   4)super obesity   5)tob use    Plan 1)admit   2)cervical ripening, low dose pitocin, GBS antibiotics    Alex Rose MD  6/15/2024  21:26 EDT                                                                       Electronically signed by Alex Rose MD at 06/15/24 2136       Facility-Administered Medications as of 6/17/2024   Medication Dose Route Frequency Provider Last Rate Last Admin    acetaminophen (TYLENOL) tablet 650 mg  650 mg Oral Q6H PRN Katelin Harris MD   650 mg at 06/17/24 0835    benzocaine (AMERICAINE) 20 % rectal ointment 1 Application  1 Application Rectal PRN Katelin Harris MD   1 Application at 06/16/24 1358    benzocaine-menthol (DERMOPLAST) 20-0.5 % topical spray   Topical PRN Katelin Harris MD   Given at 06/16/24 1357    bisacodyl (DULCOLAX) suppository 10 mg  10 mg Rectal Daily PRN Katelin Harris MD        docusate sodium (COLACE) capsule 100 mg  100 mg Oral BID Katelin Harris MD   100 mg at 06/17/24 0835    Enoxaparin Sodium (LOVENOX) syringe 40 mg  40 mg Subcutaneous Daily Katelin Harris MD   40 mg at 06/17/24 0300    HYDROcodone-acetaminophen (NORCO) 5-325 MG per tablet 1 tablet  1 tablet Oral Q8H PRN Katelin Harris MD        Or    HYDROcodone-acetaminophen (NORCO)  MG per tablet 1 tablet  1 tablet Oral Q4H PRN Katelin Harris MD        Hydrocortisone (Perianal) (ANUSOL-HC) 2.5 % rectal cream 1 Application  1 Application Rectal PRN Katelin Harris MD        ibuprofen (ADVIL,MOTRIN) tablet 600 mg  600 mg Oral Q6H Katelin Harris  MD Marilee   600 mg at 06/17/24 1223    lanolin topical 1 Application  1 Application Topical Q1H PRN Katelin Harris MD   1 Application at 06/16/24 1357    magnesium hydroxide (MILK OF MAGNESIA) suspension 10 mL  10 mL Oral Daily PRN Katelin Harris MD        [COMPLETED] oxytocin (PITOCIN) 30 units in 0.9% sodium chloride 500 mL (premix)  999 mL/hr Intravenous Once YelenaBelinda newman  mL/hr at 06/16/24 1120 999 mL/hr at 06/16/24 1120    oxytocin (PITOCIN) 30 units in 0.9% sodium chloride 500 mL (premix)  125 mL/hr Intravenous Once PRN Katelin Harris MD        [COMPLETED] penicillin G potassium 5 Million Units in sodium chloride 0.9 % 100 mL MBP  5 Million Units Intravenous Once Alex Rose MD   5 Million Units at 06/15/24 2230    sodium chloride 0.9 % flush 1-10 mL  1-10 mL Intravenous PRN Katelin Harris MD        witch hazel-glycerin (TUCKS) pad   Topical PRN Katelin Harris MD   Given at 06/16/24 1357     Orders (last 72 hrs)        Start     Ordered    06/17/24 0800  Sitz Bath  3 Times Daily        Comments: PRN    06/16/24 1344    06/17/24 0619  POC Amnisure  Once         06/17/24 0618    06/17/24 0600  CBC & Differential  Morning Draw        Comments: Postpartum Day 1      06/16/24 1344    06/17/24 0600  Hemoglobin & Hematocrit, Blood  Morning Draw,   Status:  Canceled        Comments: Postpartum Day 1      06/16/24 1344    06/17/24 0600  CBC Auto Differential  PROCEDURE ONCE        Comments: Postpartum Day 1      06/16/24 2202    06/17/24 0300  Enoxaparin Sodium (LOVENOX) syringe 40 mg  Daily         06/16/24 1344    06/17/24 0000  oxytocin (PITOCIN) 30 units in 0.9% sodium chloride 500 mL (premix)  Titrated,   Status:  Discontinued         06/15/24 2140    06/17/24 0000  bisacodyl (DULCOLAX) suppository 10 mg  Daily PRN         06/16/24 1344    06/16/24 2100  docusate sodium (COLACE) capsule 100 mg  2 Times Daily         06/16/24 1344    06/16/24  2000  Ahn Bulb Cervical Ripening  Once,   Status:  Canceled         06/15/24 2140    06/16/24 1430  ibuprofen (ADVIL,MOTRIN) tablet 600 mg  Every 6 Hours Scheduled         06/16/24 1344    06/16/24 1345  Transfer to postpartum when discharge criteria met.  Until Discontinued         06/16/24 1344    06/16/24 1345  Code Status and Medical Interventions:  Continuous         06/16/24 1344    06/16/24 1345  Vital Signs Per Hospital Policy  Per Hospital Policy         06/16/24 1344    06/16/24 1345  Notify Physician  Until Discontinued         06/16/24 1344    06/16/24 1345  Up Ad Haley  Until Discontinued         06/16/24 1344    06/16/24 1345  Ambulate Patient  Every Shift       06/16/24 1344    06/16/24 1345  Diet: Regular/House; Fluid Consistency: Thin (IDDSI 0)  Diet Effective Now         06/16/24 1344    06/16/24 1345  Advance Diet As Tolerated -Regular  Until Discontinued,   Status:  Canceled         06/16/24 1344    06/16/24 1345  Fundal and Lochia Check  Per Order Details        Comments: Every 30 minutes x2, then Every Shift    06/16/24 1344    06/16/24 1345  RN to Assess Rh Status & Place RhIG Evaluation Order if Indicated  Continuous,   Status:  Canceled         06/16/24 1344    06/16/24 1345  Bladder Assessment  Per Order Details        Comments: Postpartum 1) Upon Admission to Unit & Every 4 Hours PRN Until Voiding. 2) Out of Bed to Void in 8 Hours.    06/16/24 1344    06/16/24 1345  Straight Cath  Per Order Details        Comments: Postpartum: If Distended & Unable to Void, May Repeat Once.    06/16/24 1344    06/16/24 1345  Indwelling Urinary Catheter  Per Order Details,   Status:  Canceled        Comments: Postpartum : After Straight Cathed x2 or if Greater Than 1000mL Residual, Insert Indwelling Urinary Catheter Until Further MD Order.    06/16/24 1344    06/16/24 1345  Apply Ice to Perineum  Per Order Details        Comments: For 20 Minutes Every 2 Hours    06/16/24 1344    06/16/24 1345  Waffle  "Cushion  Per Order Details        Comments: For Perineal Discomfort    06/16/24 1344    06/16/24 1345  Donut Ring  Per Order Details        Comments: For Perineal Pain    06/16/24 1344    06/16/24 1345  Kpad  Per Order Details        Comments: For Pain    06/16/24 1344    06/16/24 1345  Breast pump to bed  Once         06/16/24 1344    06/16/24 1345  If indicated -- Please administer RH Immunoglobulin based on results of cord blood evaluation and fetal screen lab tests, pharmacy to dispense  Per Order Details,   Status:  Canceled        Comments: See Process Instructions For Reference Range Details.    06/16/24 1344    06/16/24 1345  Target Arousal Level RASS -1 to -2  Continuous         06/16/24 1344    06/16/24 1344  sodium chloride 0.9 % flush 1-10 mL  As Needed         06/16/24 1344    06/16/24 1344  oxytocin (PITOCIN) 30 units in 0.9% sodium chloride 500 mL (premix)  Once As Needed         06/16/24 1344    06/16/24 1344  acetaminophen (TYLENOL) tablet 650 mg  Every 6 Hours PRN         06/16/24 1344    06/16/24 1344  HYDROcodone-acetaminophen (NORCO) 5-325 MG per tablet 1 tablet  Every 8 Hours PRN        Placed in \"Or\" Linked Group    06/16/24 1344    06/16/24 1344  HYDROcodone-acetaminophen (NORCO)  MG per tablet 1 tablet  Every 4 Hours PRN        Placed in \"Or\" Linked Group    06/16/24 1344    06/16/24 1344  magnesium hydroxide (MILK OF MAGNESIA) suspension 10 mL  Daily PRN         06/16/24 1344    06/16/24 1344  lanolin topical 1 Application  Every 1 Hour PRN         06/16/24 1344    06/16/24 1344  benzocaine-menthol (DERMOPLAST) 20-0.5 % topical spray  As Needed         06/16/24 1344    06/16/24 1344  witch hazel-glycerin (TUCKS) pad  As Needed         06/16/24 1344    06/16/24 1344  Hydrocortisone (Perianal) (ANUSOL-HC) 2.5 % rectal cream 1 Application  As Needed         06/16/24 1344    06/16/24 1344  benzocaine (AMERICAINE) 20 % rectal ointment 1 Application  As Needed         06/16/24 1344    " "06/16/24 1215  oxytocin (PITOCIN) 30 units in 0.9% sodium chloride 500 mL (premix)  Continuous,   Status:  Discontinued        Placed in \"Followed by\" Linked Group    06/16/24 1107    06/16/24 1200  oxytocin (PITOCIN) 30 units in 0.9% sodium chloride 500 mL (premix)  Once        Placed in \"Followed by\" Linked Group    06/16/24 1107    06/16/24 1108  Notify Physician (specified)  Until Discontinued,   Status:  Canceled         06/16/24 1107    06/16/24 1108  Vital Signs Per hospital policy  Per Hospital Policy,   Status:  Canceled         06/16/24 1107    06/16/24 1108  Up as Tolerated  Until Discontinued,   Status:  Canceled         06/16/24 1107    06/16/24 1107  Apply Ice to Perineum  As Needed,   Status:  Canceled       06/16/24 1107    06/16/24 1107  ibuprofen (ADVIL,MOTRIN) tablet 600 mg  Every 6 Hours PRN,   Status:  Discontinued         06/16/24 1107    06/16/24 1107  oxyCODONE-acetaminophen (PERCOCET) 5-325 MG per tablet 1 tablet  Every 4 Hours PRN,   Status:  Discontinued         06/16/24 1107    06/16/24 1107  morphine injection 2 mg  Every 2 Hours PRN,   Status:  Discontinued         06/16/24 1107    06/16/24 1107  methylergonovine (METHERGINE) injection 200 mcg  As Needed,   Status:  Discontinued         06/16/24 1107    06/16/24 1107  carboprost (HEMABATE) injection 250 mcg  As Needed,   Status:  Discontinued         06/16/24 1107    06/16/24 1107  miSOPROStol (CYTOTEC) tablet 800 mcg  As Needed,   Status:  Discontinued         06/16/24 1107    06/16/24 1107  ondansetron ODT (ZOFRAN-ODT) disintegrating tablet 4 mg  Every 6 Hours PRN,   Status:  Discontinued        Placed in \"Or\" Linked Group    06/16/24 1107    06/16/24 1107  ondansetron (ZOFRAN) injection 4 mg  Every 6 Hours PRN,   Status:  Discontinued        Placed in \"Or\" Linked Group    06/16/24 1107    06/16/24 1107  promethazine (PHENERGAN) suppository 12.5 mg  Every 6 Hours PRN,   Status:  Discontinued        Placed in \"Or\" Linked Group    " "06/16/24 1107    06/16/24 1107  promethazine (PHENERGAN) tablet 12.5 mg  Every 6 Hours PRN,   Status:  Discontinued        Placed in \"Or\" Linked Group    06/16/24 1107    06/16/24 1107  Diet: Regular/House; Fluid Consistency: Thin (IDDSI 0)  Diet Effective Now,   Status:  Canceled        Comments: Please send chicken tenders    06/16/24 1107 06/16/24 0315  Sod Citrate-Citric Acid (BICITRA) oral solution 30 mL  Once,   Status:  Discontinued         06/16/24 0215 06/16/24 0315  fentaNYL 1 mcg/mL, Ropivacaine 0.2% in 200mL NS epidural  Continuous,   Status:  Discontinued         06/16/24 0215 06/16/24 0230  penicillin G in iso-osmotic dextrose IVPB 3 million units (premix)  Every 4 Hours,   Status:  Discontinued        Placed in \"Followed by\" Linked Group    06/15/24 2140    06/16/24 0227  penicillin G in iso-osmotic dextrose IVPB 3 million units (premix)  Every 4 Hours,   Status:  Discontinued        Placed in \"Followed by\" Linked Group    06/15/24 2144 06/16/24 0221  ! Epidural  Continuous PRN,   Status:  Discontinued         06/16/24 0221 06/16/24 0216  Vital Signs Per Anesthesia Guidelines  Per Order Details,   Status:  Canceled        Comments: Every 3 Minutes x20 Minutes Following Epidural Dosing, Then Every 15 Minutes If Stable    06/16/24 0215 06/16/24 0216  Fetal Heart Rate Monitor  Once,   Status:  Canceled         06/16/24 0215    06/16/24 0216  Nurse or anesthesiologist to remain with patient for 15 minutes following dosing.  Until Discontinued,   Status:  Canceled         06/16/24 0215    06/16/24 0216  Facilitate maternal postion on side and maintain uterine displacement.  Until Discontinued,   Status:  Canceled         06/16/24 0215    06/16/24 0216  Consult anesthesia services prior to changing epidural infusion/rate.  Until Discontinued,   Status:  Canceled         06/16/24 0215    06/16/24 0216  Notify physician for the following conditions:  Until Discontinued,   Status:  Canceled   "       06/16/24 0215 06/16/24 0215  Start IV with #16 or #18 gauge angiocath.  As Needed,   Status:  Canceled       06/16/24 0215 06/16/24 0215  famotidine (PEPCID) injection 20 mg  Once As Needed,   Status:  Discontinued         06/16/24 0215 06/16/24 0215  diphenhydrAMINE (BENADRYL) injection 12.5 mg  Every 8 Hours PRN,   Status:  Discontinued         06/16/24 0215 06/16/24 0215  lactated ringers bolus 1,000 mL  As Needed,   Status:  Discontinued         06/16/24 0215 06/16/24 0215  ePHEDrine Sulfate (Pressors) 5 MG/ML injection 10 mg  Every 10 Minutes PRN,   Status:  Discontinued         06/16/24 0215 06/16/24 0215  metoclopramide (REGLAN) injection 10 mg  Once As Needed,   Status:  Discontinued         06/16/24 0215 06/16/24 0215  ondansetron (ZOFRAN) injection 4 mg  Once As Needed,   Status:  Discontinued         06/16/24 0215 06/16/24 0000  Vital Signs q 4 while awake  Every 4 Hours,   Status:  Canceled      Comments: While the patient is awake.    06/15/24 2140    06/16/24 0000  Vital Signs q 4 while awake  Every 4 Hours,   Status:  Canceled      Comments: While the patient is awake.    06/15/24 2144    06/15/24 2312  Inpatient Case Management  Consult  Once        Provider:  (Not yet assigned)    06/15/24 2348    06/15/24 2310  Cannabinoid Confirmation, Ur - Urine, Clean Catch  Once         06/15/24 2309    06/15/24 2257  Fentanyl, Urine - Urine, Clean Catch  Once         06/15/24 2256    06/15/24 2230  sodium chloride 0.9 % flush 10 mL  Every 12 Hours Scheduled,   Status:  Discontinued         06/15/24 2140    06/15/24 2230  lactated ringers bolus 1,000 mL  Once,   Status:  Discontinued         06/15/24 2140    06/15/24 2230  lactated ringers infusion  Continuous,   Status:  Discontinued         06/15/24 2140    06/15/24 2230  mineral oil liquid 30 mL  Once,   Status:  Discontinued         06/15/24 2140    06/15/24 2230  penicillin G potassium 5 Million Units in sodium  "chloride 0.9 % 100 mL MBP  Once,   Status:  Discontinued        Placed in \"Followed by\" Linked Group    06/15/24 2140    06/15/24 2230  sodium chloride 0.9 % flush 10 mL  Every 12 Hours Scheduled,   Status:  Discontinued         06/15/24 2144    06/15/24 2230  lactated ringers infusion  Continuous,   Status:  Discontinued         06/15/24 2144    06/15/24 2230  mineral oil liquid 30 mL  Once,   Status:  Discontinued         06/15/24 2144    06/15/24 2230  penicillin G potassium 5 Million Units in sodium chloride 0.9 % 100 mL MBP  Once        Placed in \"Followed by\" Linked Group    06/15/24 2144    06/15/24 2230  oxytocin (PITOCIN) 30 units in 0.9% sodium chloride 500 mL (premix)  Titrated,   Status:  Discontinued         06/15/24 2144    06/15/24 2228  Urine Drug Screen - Urine, Clean Catch  Once         06/15/24 2227    06/15/24 2144  Insertion of Cervical Dilator  Once         06/15/24 2144    06/15/24 2143  ondansetron ODT (ZOFRAN-ODT) disintegrating tablet 4 mg  Every 6 Hours PRN,   Status:  Discontinued        Placed in \"Or\" Linked Group    06/15/24 2144    06/15/24 2143  ondansetron (ZOFRAN) injection 4 mg  Every 6 Hours PRN,   Status:  Discontinued        Placed in \"Or\" Linked Group    06/15/24 2144    06/15/24 2143  fentaNYL citrate (PF) (SUBLIMAZE) injection 100 mcg  Every 1 Hour PRN,   Status:  Discontinued         06/15/24 2144    06/15/24 2143  Preeclampsia Panel  Once,   Status:  Canceled         06/15/24 2144    06/15/24 2143  fentaNYL citrate (PF) (SUBLIMAZE) injection 50 mcg  Every 1 Hour PRN,   Status:  Discontinued         06/15/24 2144    06/15/24 2141  Admit To Obstetrics Inpatient  Once         06/15/24 2140    06/15/24 2141  Code Status and Medical Interventions:  Continuous,   Status:  Canceled         06/15/24 2140    06/15/24 2141  Obtain informed consent  Once,   Status:  Canceled         06/15/24 2140    06/15/24 2141  Place Sequential Compression Device  Once,   Status:  Canceled         " 06/15/24 2140    06/15/24 2141  Maintain Sequential Compression Device  Continuous,   Status:  Canceled         06/15/24 2140    06/15/24 2141  Vital Signs Per hospital policy  Per Hospital Policy,   Status:  Canceled         06/15/24 2140    06/15/24 2141  Mini- prep prior to delivery  Once,   Status:  Canceled         06/15/24 2140    06/15/24 2141  Continuous Fetal Monitoring With NST on Admission and Prior to Initiation of Oxytocin.  Per Order Details,   Status:  Canceled        Comments: Continuous Fetal Monitoring With NST on Admission & Prior to Initiation of Oxytocin.    06/15/24 2140    06/15/24 2141  External Uterine Contraction Monitoring  Per Hospital Policy,   Status:  Canceled         06/15/24 2140    06/15/24 2141  Notify Physician (specified)  Until Discontinued,   Status:  Canceled         06/15/24 2140    06/15/24 2141  Notify physician for hyperstimulus (per hospital algorithm)  Until Discontinued,   Status:  Canceled         06/15/24 2140    06/15/24 2141  Notify physician if membranes ruptured, bleeding greater than 1 pad an hour, fetal heart tone abnormality, and severe pain  Until Discontinued,   Status:  Canceled         06/15/24 2140    06/15/24 2141  Up Ad Haley  Until Discontinued,   Status:  Canceled         06/15/24 2140    06/15/24 2141  Cervical Exam  Once,   Status:  Canceled        Comments: Unless contraindicated, every 1-2 hours in active labor, or at nurse's discretion.    06/15/24 2140    06/15/24 2141  Initiate Group Beta Strep (GBS) Prophylaxis Protocol, If Criteria Met  Continuous,   Status:  Canceled        Comments: NO TREATMENT RECOMMENDED IF: 1)  Maternal GBS status known negative 2)  Scheduled  birth with intact membranes, not in labor.  3 ) Maternal GBS unknown, no risk factors.   TREAT WITH ANTIBIOTICS IF:  1)  Maternal GBS status is known postive.  2)  Maternal GBS status unknown with these risk factors:  a)  Previous infant affected by GBS infection.  b)  GBS  urinary tract infection (UTI) or bacteruria during pregnancy  c)  Unexplained maternal fever in labor (greater than or equal to 100.4F or 38.0C)  d)  Prolonged rupture of the membranes greater than or equal to 18 hours.  e)  Gestational age less than 37 weeks.    06/15/24 2140    06/15/24 2141  NPO Diet NPO Type: Ice Chips  Diet Effective Now,   Status:  Canceled         06/15/24 2140    06/15/24 2141  CBC (No Diff)  STAT         06/15/24 2140    06/15/24 2141  T Pallidum Antibody w/ reflex RPR (Syphilis)  Once         06/15/24 2140    06/15/24 2141  Type & Screen  STAT         06/15/24 2140    06/15/24 2141  Insert Peripheral IV  Once,   Status:  Canceled         06/15/24 2140    06/15/24 2141  Saline Lock & Maintain IV Access  Continuous,   Status:  Canceled         06/15/24 2140    06/15/24 2141  Protein / Creatinine Ratio, Urine - Urine, Clean Catch  Once         06/15/24 2140    06/15/24 2141  Lactate Dehydrogenase  Once         06/15/24 2140    06/15/24 2141  Uric Acid  Once         06/15/24 2140    06/15/24 2141  Comprehensive Metabolic Panel  Once         06/15/24 2140    06/15/24 2140  Position change  As Needed,   Status:  Canceled      Comments: For intra-uterine resuscitation for hypertonus, hypertstimulation, or non-reassuring fetal status    06/15/24 2140    06/15/24 2140  sodium chloride 0.9 % flush 10 mL  As Needed,   Status:  Discontinued         06/15/24 2140    06/15/24 2140  sodium chloride 0.9 % infusion 40 mL  As Needed,   Status:  Discontinued         06/15/24 2140    06/15/24 2140  lidocaine PF 1% (XYLOCAINE) injection 0.5 mL  Once As Needed,   Status:  Discontinued         06/15/24 2140    06/15/24 2140  acetaminophen (TYLENOL) tablet 650 mg  Every 4 Hours PRN,   Status:  Discontinued         06/15/24 2140    06/15/24 2140  hydrOXYzine (ATARAX) tablet 50 mg  Nightly PRN,   Status:  Discontinued         06/15/24 2140    06/15/24 2140  morphine injection 1 mg  Every 4 Hours PRN,   Status:   "Discontinued        Placed in \"And\" Linked Group    06/15/24 2140    06/15/24 2140  naloxone (NARCAN) injection 0.4 mg  Every 5 Minutes PRN,   Status:  Discontinued        Placed in \"And\" Linked Group    06/15/24 2140    06/15/24 2140  morphine injection 2 mg  Every 4 Hours PRN,   Status:  Discontinued        Placed in \"And\" Linked Group    06/15/24 2140    06/15/24 2140  naloxone (NARCAN) injection 0.4 mg  Every 5 Minutes PRN,   Status:  Discontinued        Placed in \"And\" Linked Group    06/15/24 2140    06/15/24 2140  Admit To Obstetrics Inpatient  Once         06/15/24 2144    06/15/24 2140  Code Status and Medical Interventions:  Continuous,   Status:  Canceled         06/15/24 2144    06/15/24 2140  Obtain Informed Consent  Once,   Status:  Canceled         06/15/24 2144    06/15/24 2140  Place Sequential Compression Device  Once         06/15/24 2144    06/15/24 2140  Maintain Sequential Compression Device  Continuous         06/15/24 2144    06/15/24 2140  Vital Signs Per Hospital Policy  Per Hospital Policy,   Status:  Canceled         06/15/24 2144    06/15/24 2140  Mini-Prep Prior to Delivery  Once,   Status:  Canceled         06/15/24 2144    06/15/24 2140  Continuous Fetal Monitoring With NST on Admission and Prior to Initiation of Oxytocin.  Per Order Details,   Status:  Canceled        Comments: Continuous Fetal Monitoring With NST on Admission & Prior to Initiation of Oxytocin.    06/15/24 2144    06/15/24 2140  External Uterine Contraction Monitoring  Per Hospital Policy,   Status:  Canceled         06/15/24 2144    06/15/24 2140  Notify Provider (Specified)  Until Discontinued,   Status:  Canceled         06/15/24 2144    06/15/24 2140  Notify Provider of Tachysystole (Per Hospital Algorithm)  Until Discontinued,   Status:  Canceled         06/15/24 2144    06/15/24 2140  Notify Provider if Membranes Ruptured, Bleeding Greater Than 1 Pad Per Hour, Fetal Heart Tone Abnormality or Severe Pain  Until " Discontinued,   Status:  Canceled         06/15/24 2144    06/15/24 2140  Initiate Group Beta Strep (GBS) Prophylaxis Protocol, If Criteria Met  Continuous,   Status:  Canceled        Comments: NO TREATMENT RECOMMENDED IF: 1) Maternal GBS Status Known Negative 2) Scheduled  Birth With Intact Membranes, Not in Labor 3) Maternal GBS Status Unknown, No Risk Factors  TREAT WITH ANTIBIOTICS IF:  1) Maternal GBS Status Known Positive 2) Maternal GBS Status Unknown With Risk Factors: a)  Previous Infant Affected By GBS Infection b) GBS Urinary Tract Infection (UTI) or Bacteriuria During Pregnancy c) Unexplained Maternal Fever (100.4F (38C) or Greater) During Labor d)  Prolonged Rupture of Membranes (18 or More Hours) e) Gestational Age Less Than 37 Weeks    06/15/24 2144    06/15/24 2140  Insert Peripheral IV  Once,   Status:  Canceled         06/15/24 2144    06/15/24 2140  Saline Lock & Maintain IV Access  Continuous,   Status:  Canceled         06/15/24 2144    06/15/24 2140  Diet: Liquid; Clear Liquid; Fluid Consistency: Thin (IDDSI 0)  Diet Effective Now,   Status:  Canceled         06/15/24 2144    06/15/24 2139  Position Change - For Intra-Uterine Resusitation for Hypertonus, HyperStimulation or Non-Reassuring Fetal Status  As Needed,   Status:  Canceled       06/15/24 2144    06/15/24 2139  sodium chloride 0.9 % flush 10 mL  As Needed,   Status:  Discontinued         06/15/24 2144    06/15/24 2139  sodium chloride 0.9 % infusion 40 mL  As Needed,   Status:  Discontinued         06/15/24 2144    06/15/24 2139  lidocaine PF 1% (XYLOCAINE) injection 0.5 mL  Once As Needed,   Status:  Discontinued         06/15/24 2144    06/15/24 2139  lactated ringers bolus 1,000 mL  Once As Needed,   Status:  Discontinued         06/15/24 2144    06/15/24 2139  acetaminophen (TYLENOL) tablet 650 mg  Every 4 Hours PRN,   Status:  Discontinued         06/15/24 2144    Unscheduled  Warm compress  As Needed       24 [id number]  "   Unscheduled  Apply ace wrap, tight bra, or binder  As Needed       06/16/24 1344    Unscheduled  Apply ice packs  As Needed       06/16/24 1344    Signed and Held  Notify Provider (Specified)  Until Discontinued,   Status:  Canceled         Signed and Held    Signed and Held  Vital Signs Per Hospital Policy  Per Hospital Policy,   Status:  Canceled         Signed and Held    Signed and Held  Fundal & Lochia Check  Per Order Details,   Status:  Canceled      Comments: Every 15 Minutes x4, Then Every 30 Minutes x2, Then Every Shift    Signed and Held    Signed and Held  Fundal & Lochia Check  Every Shift,   Status:  Canceled       Signed and Held    Signed and Held  Diet: Regular/House; Fluid Consistency: Thin (IDDSI 0)  Diet Effective Now,   Status:  Canceled         Signed and Held    Signed and Held  oxytocin (PITOCIN) 30 units in 0.9% sodium chloride 500 mL (premix)  Once,   Status:  Canceled        Placed in \"Followed by\" Linked Group    Signed and Held    Signed and Held  oxytocin (PITOCIN) 30 units in 0.9% sodium chloride 500 mL (premix)  Continuous,   Status:  Canceled        Placed in \"Followed by\" Linked Group    Signed and Held    Signed and Held  methylergonovine (METHERGINE) injection 200 mcg  Once As Needed,   Status:  Canceled         Signed and Held    Signed and Held  carboprost (HEMABATE) injection 250 mcg  As Needed,   Status:  Canceled         Signed and Held    Signed and Held  miSOPROStol (CYTOTEC) tablet 800 mcg  As Needed,   Status:  Canceled         Signed and Held    Pending  Discharge patient  Once         Pending    Pending  benzocaine-menthol (DERMOPLAST) 20-0.5 % aerosol topical spray  3 Times Daily PRN         Pending    Pending  docusate sodium 100 MG capsule  2 Times Daily PRN         Pending    Pending  ibuprofen (ADVIL,MOTRIN) 600 MG tablet  Every 6 Hours PRN         Pending    Pending  witch hazel-glycerin (TUCKS) pad  As Needed         Pending                   "   Operative/Procedure Notes (last 72 hours)        Alex Rose MD at 06/15/24 2251        Procedure Orders    1. Insertion of Cervical Dilator [[id number]] ordered by Alex Rose MD at 06/15/24 2142               Pre-procedure Diagnoses    1. 37 weeks gestation of pregnancy [Z3A.37]    2. Gestational hypertension without significant proteinuria during pregnancy in third trimester, antepartum [O13.3]    3. Rigid cervix (uteri) affecting pregnancy, third trimester [O34.43]    4.  premature rupture of membranes with onset of labor within 24 hours of rupture in third trimester [O42.013]               Post-procedure Diagnoses    1. 37 weeks gestation of pregnancy [Z3A.37]    2. Gestational hypertension without significant proteinuria during pregnancy in third trimester, antepartum [O13.3]    3. Rigid cervix (uteri) affecting pregnancy, third trimester [O34.43]    4.  premature rupture of membranes with onset of labor within 24 hours of rupture in third trimester [O42.013]                 Transcervical whaley placed per physician request.  FHT's class 1.  Patient tolerated well. 24 Cuban, 60ml.    Alex Rose MD  6/15/2024  22:52 EDT       Electronically signed by Alex Rose MD at 06/15/24 9847       Katelin Harris MD at 24 1135            2024    Patient:Liliam Romero    MR#:[id number]    Vaginal Delivery Note  19 y.o. yo female  at 37w1d      Gestational hypertension, third trimester    Elevated 1 hour GCT - normal 3 hour GTT     premature rupture of membranes (PPROM) with onset of labor within 24 hours of rupture in third trimester, antepartum       Delivery     Delivery: Vaginal, Vacuum (Extractor)     YOB: 2026    Time of Birth: 11:15 AM      Anesthesia: Epidural     Delivering clinician: Katelin Harris    Forceps?   No   Vacuum? Yes  Vacuum Delivery  Vacuum attempted?      Vacuum indication:     Vacuum type: Kiwi     Application location:     First Attempt     Time applied:     Time removed:     Second Attempt    Time applied:     Time removed:     Third Attempt    Time applied:     Time removed:     Number of pulls:     Number of pop-offs:     Low-end pressure range:     High-end pressure range:     Total application time:     Applied by:     Failed?         Shoulder dystocia present: No          Infant    Findings: male  infant     Infant observations: Weight: 2810 g (6 lb 3.1 oz)     Observations/Comments:        Apgars: 8  @ 1 minute /    9  @ 5 minutes         Placenta, Cord, and Fluid    Placenta delivered  Spontaneous  at  2024 11:20 AM     Cord:   present.   Nuchal Cord?  yes; Number of nuchal loops present:   2   Cord blood obtained:     Cord gases obtained:      Cord gas results: Pending         Repair    Episiotomy: No   Lacerations: Yes  Laceration Information  Laceration Repaired?   Perineal:       Periurethral:       Labial: bilateral  Yes    Sulcus:       Vaginal:       Cervical:         Suture used for repair: 3-0 Vicryl, Rapide, single interrupted for left labial     Estimated Blood Loss:  200 mls.         Complications  none    Disposition  Mother to Mother Baby/Postpartum  in stable condition currently.  Baby to remains with mom  in stable condition currently.    Description of Delivery  Patient pushed well x 1-1.5hr and as she was , fetal heart rate bradycardia to 50's and was sustained and so as she did not push out with that contraction, decision was made to do vacuum extraction.  She was consented quickly and DRT, charge called.  With the next contraction, baby delivered easily from OA presentation.  There was a nuchal x 2, reduced after delivery.  Shoulders delivered easily. Nose and mouth was bulb suctioned and baby was placed on mother's abdomen.  Cord was milked,  clamped and cut.  Left labia was reapproximated with single interrupted 3-0 vicryl rapide.  Placenta delivered easily and  with pitocin bolus, there was no uterine atony.       Katelin Harris MD  24  11:36 EDT            Electronically signed by Katelin Harris MD at 24 1140          Physician Progress Notes (last 72 hours)        Belinda Kirk MD at 24 0814           Naomy Romero  : 2005  MRN: [id number]  CSN: [id number]    Postpartum Day #1  CC: routine postpartum care   Subjective   Her pain is well controlled.  Vaginal bleeding is less than a normal period.      Objective     Min/max vitals past 24 hours:   Temp  Min: 98 °F (36.7 °C)  Max: 98.6 °F (37 °C)  BP  Min: 100/60  Max: 129/69  Pulse  Min: 74  Max: 97  Resp  Min: 18  Max: 20        Abdomen: soft, non-tender; bowel sounds normal; no masses   fundus firm and non-tender   Pelvic: deferred     Results from last 7 days   Lab Units 24  0504 06/15/24  2228 24  1119   WBC 10*3/mm3 15.08* 14.38* 11.67*   HEMOGLOBIN g/dL 12.0 13.3 13.2   HEMATOCRIT % 37.0 39.4 39.8   PLATELETS 10*3/mm3 218 286 274     Lab Results   Component Value Date    RH Positive 06/15/2024    HEPBSAG Non-Reactive 2023       Assessment   Postpartum Day #1 S/P vaginal delivery  Doing well  Gestational hypertension - normotensive today     Plan   Continue routine postpartum care  Defer circumcision - baby to see pediatric urology    Belinda Kirk MD  2024  08:14 EDT            Electronically signed by Belinda Kirk MD at 24 0816       Katelin Harris MD at 24 0125          Patient feeling contractions but still pretty comfortable    FHT Cat 1  Ctx's not tracing well    CE /-1, post  Leaking clear fluid    IUPC and FSE placed without difficulty    Last growth 42%ile    Preelampsia Labs:    Results from last 7 days   Lab Units 06/15/24  2228   WBC 10*3/mm3 14.38*   HEMOGLOBIN g/dL 13.3   HEMATOCRIT % 39.4   PLATELETS 10*3/mm3 286   POTASSIUM mmol/L 3.8   ALT (SGPT) U/L 11   AST (SGOT) U/L 12    LDH U/L 295*   CREATININE mg/dL 0.68   BILIRUBIN mg/dL <0.2   URIC ACID mg/dL 6.8*      20yo G1 @ 37w1d with gestational hypertension, BMI 52, elevated 1hr GTT, normal 3hr  admitted in labor    -augment and deliver  - GBS pos - on abx    Katelin Harris MD  06/16/24  01:27 EDT      Electronically signed by Katelin Harris MD at 06/16/24 4651

## 2024-06-17 NOTE — OUTREACH NOTE
[facility]  IP Postpartum    Questions/Answers      Flowsheet Row Responses   Best Method for Contacting Cell   Support Person Present Yes   Does the patient have a car seat at the hospital Yes   Delivery Note Reviewed Reviewed   Were birth expectations met? Yes   Is there a need for additional support/resources? Yes   Yes, other reources needed comment Desires housing resources   Lactation Note Reviewed Not Reviewed   Not Reviewed Comment Bottle feeding   Is additional support needed? No   Any questions or concerns? No   Any concerns related discharge meds/ability to  prescriptions? No   Confirm Postpartum OB appointment Yes   Postpartum OB appointment date 24   Confirm initial well-child Pediatrician appointment date/time: No  [Pt to [facility] appt with [facility]]   Additional post-discharge F/U appointments No   Does patient have transportation to appointments? Yes   Any other assistance needed to ensure she is able to attend appointments? No   Does patient have supplies needed at home for  care? Clothing, Crib, Diapers, Formula            Feeling well after delivery.  FOB-Arun at  and supportive. Provided with POST Birth warning signs flyer from [employer].   Liliam had questions regarding housing resources and updated resources were sent via [employer].  Encouraged to call with questions, concerns, or for support. Contact information provided. Will f/u 24.    Soumya Ferguson RN  Maternity Nurse Navigator    2024, 12:14 EDT

## 2024-06-18 VITALS
DIASTOLIC BLOOD PRESSURE: 54 MMHG | TEMPERATURE: 98.3 F | RESPIRATION RATE: 18 BRPM | HEIGHT: 62 IN | BODY MASS INDEX: 52.63 KG/M2 | SYSTOLIC BLOOD PRESSURE: 98 MMHG | HEART RATE: 86 BPM | WEIGHT: 286 LBS | OXYGEN SATURATION: 94 %

## 2024-06-18 PROCEDURE — 25010000002 ENOXAPARIN PER 10 MG: Performed by: OBSTETRICS & GYNECOLOGY

## 2024-06-18 PROCEDURE — 0503F POSTPARTUM CARE VISIT: CPT | Performed by: OBSTETRICS & GYNECOLOGY

## 2024-06-18 RX ORDER — IBUPROFEN 600 MG/1
600 TABLET ORAL EVERY 6 HOURS PRN
Qty: 60 TABLET | Refills: 1 | Status: SHIPPED | OUTPATIENT
Start: 2024-06-18

## 2024-06-18 RX ORDER — PSEUDOEPHEDRINE HCL 30 MG
100 TABLET ORAL 2 TIMES DAILY PRN
Qty: 60 CAPSULE | Refills: 1 | Status: SHIPPED | OUTPATIENT
Start: 2024-06-18

## 2024-06-18 RX ADMIN — IBUPROFEN 600 MG: 600 TABLET, FILM COATED ORAL at 06:14

## 2024-06-18 RX ADMIN — ENOXAPARIN SODIUM 40 MG: 40 INJECTION SUBCUTANEOUS at 02:36

## 2024-06-18 RX ADMIN — IBUPROFEN 600 MG: 600 TABLET, FILM COATED ORAL at 12:28

## 2024-06-18 RX ADMIN — ACETAMINOPHEN 650 MG: 325 TABLET ORAL at 09:26

## 2024-06-18 RX ADMIN — DOCUSATE SODIUM 100 MG: 100 CAPSULE, LIQUID FILLED ORAL at 09:26

## 2024-06-18 RX ADMIN — ACETAMINOPHEN 650 MG: 325 TABLET ORAL at 01:59

## 2024-06-18 NOTE — CASE MANAGEMENT/SOCIAL WORK
Continued Stay Note   [place]     Patient Name: Liliam Romero  MRN: [id number]  Today's Date: 6/18/2024    Admit Date: 6/15/2024    Plan: plans to return to aunt's home   Discharge Plan       Row Name 06/18/24 1049       Plan    Plan plans to return to aunt's home    Plan Comments Spoke with pt. She reports she spoke with her aunt yesterday. States her aunt is allowing them to return to the aunt's home. Pt reports she has all needed for infant. States she will call [facility].    Final Discharge Disposition Code 01 - home or self-care                   Discharge Codes    No documentation.                 Expected Discharge Date and Time       Expected Discharge Date Expected Discharge Time    Jun 18, 2024               OMID Sharp

## 2024-06-18 NOTE — PAYOR COMM NOTE
"Liliam Romero (19 y.o. Female)     [facility] Auth#[id number]     Discharged 6/18/24 with Baby.    From:Desi Arriaga LPN, Utilization Review  Phone #[phone]  Fax #[phone]        Date of Birth   2005    Social Security Number       Address   [address] [place] [address]  [facility] [address]    Home Phone   [phone]    MRN   [id number]       Judaism   None    Marital Status   Single                            Admission Date   6/15/24    Admission Type   Elective    Admitting Provider   Belinda Kirk MD    Attending Provider       Department, Room/Bed   [facility] MOTHER BABY 4B, N427/1       Discharge Date   6/18/2024    Discharge Disposition   Home or Self Care    Discharge Destination   Home                              Attending Provider: (none)   Allergies: No Known Allergies    Isolation: None   Infection: None   Code Status: Prior    Ht: 157.5 cm (62\")   Wt: 130 kg (286 lb)    Admission Cmt: None   Principal Problem: Gestational hypertension, third trimester [O13.3]                   Active Insurance as of 6/15/2024       Primary Coverage       Payor Plan Insurance Group Employer/Plan Group    [facility] COMMUNITY PLAN [facility] COMMUNITY PLAN [facility]       Payor Plan Address Payor Plan Phone Number Payor Plan Fax Number Effective Dates    [address]   12/1/2023 - None Entered    [facility] [id number]         Subscriber Name Subscriber Birth Date Member ID       LILIAM ROMERO 2005 [id number]                     Emergency Contacts        (Rel.) Home Phone Work Phone Mobile Phone    NORI LEIJA (Relative) [phone] -- [phone]    DIMPLE STORY (Significant Other) -- -- [phone]    MAYRA SAXENA (Other) -- -- [phone]                 Discharge Summary        Blaze Goss MD at 06/18/24 0911          Discharge Summary    Date of Admission: 6/15/2024  Date of Discharge:  6/18/2024      Patient: Liliam Garay" Nick      MR#:[id number]    Delivery Provider: Katelin Harris     Discharge Surgeon/OB: Blaze Goss    Presenting Problem/History of Present Illness  Gestational hypertension, third trimester [O13.3]   premature rupture of membranes (PPROM) with onset of labor within 24 hours of rupture in third trimester, antepartum [O42.013]       Gestational hypertension, third trimester    Elevated 1 hour GCT - normal 3 hour GTT     premature rupture of membranes (PPROM) with onset of labor within 24 hours of rupture in third trimester, antepartum         Discharge Diagnosis: Vaginal delivery at 37w1d    Procedures:  Vaginal, Vacuum (Extractor)     2024    11:15 AM        Discharge Date: 2024;     Hospital Course  Patient is a 19 y.o. female  at 37w1d status post vaginal delivery without complication. Postpartum the patient did well. She remained afebrile, with vital signs stable. She was ready for discharge on postpartum day 2.     Infant:   male  fetus 2810 g (6 lb 3.1 oz)  with Apgar scores of 8 , 9  at five minutes.    Condition on Discharge:  Stable    Vital Signs  Temp:  [98.3 °F (36.8 °C)-98.6 °F (37 °C)] 98.3 °F (36.8 °C)  Heart Rate:  [84-89] 86  Resp:  [18] 18  BP: ()/(54-74) 98/54    Lab Results   Component Value Date    WBC 15.08 (H) 2024    HGB 12.0 2024    HCT 37.0 2024    MCV 86.4 2024     2024       Discharge Disposition  Home or Self Care    Discharge Medications     Discharge Medications        New Medications        Instructions Start Date   benzocaine-menthol 20-0.5 % aerosol topical spray  Commonly known as: DERMOPLAST   Topical, 3 Times Daily PRN      ibuprofen 600 MG tablet  Commonly known as: ADVIL,MOTRIN   600 mg, Oral, Every 6 Hours PRN      witch hazel-glycerin pad  Commonly known as: TUCKS   Topical, As Needed             Changes to Medications        Instructions Start Date   docusate sodium 100 MG  capsule  Commonly known as: Colace  What changed: Another medication with the same name was added. Make sure you understand how and when to take each.   100 mg, Oral, 2 Times Daily PRN      docusate sodium 100 MG capsule  What changed: You were already taking a medication with the same name, and this prescription was added. Make sure you understand how and when to take each.   100 mg, Oral, 2 Times Daily PRN             Continue These Medications        Instructions Start Date   Blood Pressure Cuff misc   1 Device, Does not apply, 2 Times Daily, Please check your BP twice daily and record. Please let us know if you are getting readings above 150 top number or 100 bottom number      famotidine 20 MG tablet  Commonly known as: Pepcid   20 mg, Oral, 2 Times Daily      prenatal vitamin 27-0.8 27-0.8 MG tablet tablet   Oral, Daily               Discharge Diet: Regular     Activity at Discharge: Routine post partum activity instructions given    Follow-up Appointments  Future Appointments   Date Time Provider Department Center   7/29/2024  1:40 PM Belinda Kirk MD [facility] JAVED Goss MD  06/18/24  09:12 EDT  [facility]          Electronically signed by Blaze Goss MD at 06/18/24 0912

## 2024-06-18 NOTE — DISCHARGE SUMMARY
Discharge Summary    Date of Admission: 6/15/2024  Date of Discharge:  2024      Patient: Liliam Romero      MR#:[id number]    Delivery Provider: Katelin Harris     Discharge Surgeon/OB: Blaze Goss    Presenting Problem/History of Present Illness  Gestational hypertension, third trimester [O13.3]   premature rupture of membranes (PPROM) with onset of labor within 24 hours of rupture in third trimester, antepartum [O42.013]       Gestational hypertension, third trimester    Elevated 1 hour GCT - normal 3 hour GTT     premature rupture of membranes (PPROM) with onset of labor within 24 hours of rupture in third trimester, antepartum         Discharge Diagnosis: Vaginal delivery at 37w1d    Procedures:  Vaginal, Vacuum (Extractor)     2024    11:15 AM        Discharge Date: 2024;     Hospital Course  Patient is a 19 y.o. female  at 37w1d status post vaginal delivery without complication. Postpartum the patient did well. She remained afebrile, with vital signs stable. She was ready for discharge on postpartum day 2.     Infant:   male  fetus 2810 g (6 lb 3.1 oz)  with Apgar scores of 8 , 9  at five minutes.    Condition on Discharge:  Stable    Vital Signs  Temp:  [98.3 °F (36.8 °C)-98.6 °F (37 °C)] 98.3 °F (36.8 °C)  Heart Rate:  [84-89] 86  Resp:  [18] 18  BP: ()/(54-74) 98/54    Lab Results   Component Value Date    WBC 15.08 (H) 2024    HGB 12.0 2024    HCT 37.0 2024    MCV 86.4 2024     2024       Discharge Disposition  Home or Self Care    Discharge Medications     Discharge Medications        New Medications        Instructions Start Date   benzocaine-menthol 20-0.5 % aerosol topical spray  Commonly known as: DERMOPLAST   Topical, 3 Times Daily PRN      ibuprofen 600 MG tablet  Commonly known as: ADVIL,MOTRIN   600 mg, Oral, Every 6 Hours PRN      witch hazel-glycerin pad  Commonly known as: TUCKS   Topical, As  Needed             Changes to Medications        Instructions Start Date   docusate sodium 100 MG capsule  Commonly known as: Colace  What changed: Another medication with the same name was added. Make sure you understand how and when to take each.   100 mg, Oral, 2 Times Daily PRN      docusate sodium 100 MG capsule  What changed: You were already taking a medication with the same name, and this prescription was added. Make sure you understand how and when to take each.   100 mg, Oral, 2 Times Daily PRN             Continue These Medications        Instructions Start Date   Blood Pressure Cuff misc   1 Device, Does not apply, 2 Times Daily, Please check your BP twice daily and record. Please let us know if you are getting readings above 150 top number or 100 bottom number      famotidine 20 MG tablet  Commonly known as: Pepcid   20 mg, Oral, 2 Times Daily      prenatal vitamin 27-0.8 27-0.8 MG tablet tablet   Oral, Daily               Discharge Diet: Regular     Activity at Discharge: Routine post partum activity instructions given    Follow-up Appointments  Future Appointments   Date Time Provider Department Center   7/29/2024  1:40 PM Belinda Kirk MD [facility] JAVED Goss MD  06/18/24  09:12 EDT  [facility]

## 2024-06-18 NOTE — PLAN OF CARE
Goal Outcome Evaluation:    Vitals stable. No s/s infection. Bleeding minimal and no clots.Fundus firm u/u.

## 2024-06-28 ENCOUNTER — PATIENT OUTREACH (OUTPATIENT)
Dept: LABOR AND DELIVERY | Facility: HOSPITAL | Age: 19
End: 2024-06-28
Payer: MEDICAID

## 2024-06-28 LAB — REF LAB TEST METHOD: NORMAL

## 2024-06-28 NOTE — OUTREACH NOTE
[facility]  Unable to Reach       Questions/Answers      Flowsheet Row Responses   Pending Outreach Postpartum Check-in   Call Attempt First   Outcome No answer/busy, Left message   Next Call Attempt Date 07/01/24            EPDS questionnaire sent via [employer]. Contact information provided.    CHEKO eFrguson RN  Maternity Nurse Navigator    6/28/2024, 13:29 EDT

## 2024-07-01 ENCOUNTER — PATIENT OUTREACH (OUTPATIENT)
Dept: LABOR AND DELIVERY | Facility: HOSPITAL | Age: 19
End: 2024-07-01
Payer: MEDICAID

## 2024-07-01 NOTE — OUTREACH NOTE
"Motherhood Connection  Postpartum Check-In    Questions/Answers      Flowsheet Row Responses   Visit Setting Telephone   Best Method for Contacting Cell   OB Discharge Note Reviewed  Reviewed   OB Discharge Medications Reviewed  Reviewed    discharged home with mother? Yes   Verbalized Emotional State Acceptance   Family/Support Network Family, Significant Other   Level of Involvement in Care Attentive   Do you feel comfortable in your relationship with your baby? Yes   Have members of your household adjusted to your baby? Yes   Is the baby's father supportive and/or involved with the baby? Yes   How does your partner feel about the baby? Happy, Involved   Do you feel safe at home, school and work? Yes   Are you in a relationship with someone who threatens you or hurts you? No   Do you have the resources to keep yourself and your baby healthy and safe? Yes   Lochia (per patient report) Rubra   Amount None   Number of pads per day 4   Lochia Odor None   Is patient breastfeeding? No   How is breast suppression going? \"I'm not having any issues.\"   Postpartum Depression Screening Education Education Provided   Doctor Appointments: Education Provided   Postpartum Care Education Education Provided   Followup Appointments Made Yes   Well Child Visit Appointments Made Yes   Appointment Date 24   Well Child Checkup Provider Name Key Dean ARSALAN   Well Child Check Up Date: 24   Did you complete the visit? Yes   Were there any specific concerns? Yes   Concerns: Weight check and jaundice   Has additional follow-up with pediatrician or specialist been recommended? Yes   Follow-Up Recommended: Peds Urology for Hypospadias   Umbilical Cord No reported signs or symptoms   Was the baby circumcised? No  [Has hypospadias, appt with Peds Urology 24]   Infant Feeding Method Formula   Is a lactation referral indicated? No   Formula Type Other  [Similac Advance]   Other Formula Similac Advance   Formula PO " (mL) 90   Formula/Expressed Milk frequency of feedings: q3 hrs   Number of wet diapers x 24 hours 8   Last BM x 24 hours 8   Emesis (Unmeasured Occurence) Only when over eating   What safe sleep surface is available? Bassinet   Are there stuffed animals, toys, pillows, quilts, blankets, wedges, positioners, bumpers or other loose bedding in the infant's sleeping environment? No   Where does the baby usually sleep? Bassinet   Does the baby ever share a sleep surface with a sibling, adult or pet? No   Does the baby ever share a sleep surface in a bed, couch, recliner or other? No   What position do you place your baby to sleep for naps? Back   What position do you place your baby to sleep at night Back   Are you and/or other caregivers smoking inside or outside the baby's home? No   Is the infant dressed appropiately for the temperature of the home? Yes   Do you use a clean, dry pacifier that is not attached to a string or stuffed animal? No            Review of Systems   Constitutional: Negative.    HENT: Negative.     Eyes: Negative.    Respiratory: Negative.     Cardiovascular: Negative.    Gastrointestinal: Negative.    Endocrine: Negative.    Genitourinary:  Positive for vaginal bleeding.        Reports normal lochia.   Musculoskeletal: Negative.    Skin:  Positive for wound.        Reports no pain, swelling or redness at site of labial lacerations.    Allergic/Immunologic: Negative.    Neurological: Negative.    Hematological: Negative.    Psychiatric/Behavioral: Negative.       Most Recent [place]  Depression Scale Score (EPDS)    Performed by a clinician: 8 (2024  3:27 PM)      5 Ps Screen  Completed    Feeling well since going home. Minimal pain and lochia WNL. States mood has been stable. Reports good family support.   Baby doing well, he will f/u at [facility] for circ and hypospadias repair in December.     No community resource needs at present. Updated resources provided via [employer]  message. RN from call center to f/u next week. Contact information provided. Encouraged to call with questions, concerns, or for support before then.        Soumya Ferguson RN  Maternity Nurse Navigator    7/1/2024, 16:28 EDT

## 2024-07-08 ENCOUNTER — PATIENT OUTREACH (OUTPATIENT)
Dept: CALL CENTER | Facility: HOSPITAL | Age: 19
End: 2024-07-08
Payer: MEDICAID

## 2024-07-08 NOTE — OUTREACH NOTE
Motherhood Connection Survey      Flowsheet Row Responses   [facility] patient discharged from? [place]   Week 1 attempt successful? Yes   Call start time 1333   Call end time 1347   Baby sex Boy    discharged home with mother? Yes   Baby sex Boy   Delivery type Vaginal   Emotional state Acceptance   Family support Yes   Do you have all necessary resources to care for you and your baby?  Yes   Have members of your household adjusted to your baby? Yes   Did you have any problems with pre-eclampsia during this pregnancy? Yes   Do you have any of the following: Headache   Did you have blood glucose issues during this pregnancy No   Are you currently experiencing Headaches   Lochia amount None   Did you have an episiotomy/tear/abdominal incision? Yes   Additional comments Mom thinks headaches are more from sleep depreviation. Her stitch has fallen out and she is healing well. Mom has had some postpartum depression and anxiety. She will discuss with GYN. She has good support with her FOB.   Feeding Method Bottle   Frequency Every 2 to 3 hours   Amount usually 3 ounces , sometimes closer to 4   Breast Condition No   Nipple Condition No   Signs baby is ready to eat Rooting, Crying   Feeding tolerance Adequate pause for breath, Weight gain, Wet/dirty diapers   Number of wet diapers x 24 hours At least with every feeding   Last BM x 24 hours 1   Umbilical Cord No reported signs or symptoms   Was the baby circumcised? No   Circumcision comments Baby has Hypospadius repair scheduled in Dec   Where does the baby usually sleep? Bassinet   Are there stuffed animals, toys, pillows, quilts, blankets, wedges, positioners, bumpers or other loose bedding in the infant's sleeping environment? No   Does the baby ever share a sleep surface in a bed, couch, recliner or other? No   What position do you lay your baby down to sleep? Back   Are you and/or other caregivers smoking inside or outside the baby's home? No   Mom  appointment comments: Mom has a followup scheduled and baby has been for his followups and is above birth weight!   Feeding method comment Mom and baby have a WIC appt today. Baby is having alot of gas with Sim and mom has a note from peds to switch to Sensitive   Call completed? Yes              Adriel MCKAY - Registered Nurse

## 2024-07-28 PROBLEM — O99.210 OBESITY IN PREGNANCY, ANTEPARTUM: Status: RESOLVED | Noted: 2023-11-21 | Resolved: 2024-07-28

## 2024-07-28 PROBLEM — Z34.03 ENCOUNTER FOR SUPERVISION OF NORMAL FIRST PREGNANCY IN THIRD TRIMESTER: Status: RESOLVED | Noted: 2023-11-21 | Resolved: 2024-07-28

## 2024-07-28 PROBLEM — O99.810 ABNORMAL O'SULLIVAN GLUCOSE CHALLENGE TEST, ANTEPARTUM: Status: RESOLVED | Noted: 2024-05-17 | Resolved: 2024-07-28

## 2024-07-28 PROBLEM — O28.5 ABNORMAL GENETIC TEST DURING PREGNANCY: Status: RESOLVED | Noted: 2023-12-24 | Resolved: 2024-07-28

## 2024-07-28 PROBLEM — O99.820 GBS (GROUP B STREPTOCOCCUS CARRIER), +RV CULTURE, CURRENTLY PREGNANT: Status: RESOLVED | Noted: 2024-06-14 | Resolved: 2024-07-28

## 2024-07-28 PROBLEM — R82.5 POSITIVE URINE DRUG SCREEN: Status: RESOLVED | Noted: 2023-11-21 | Resolved: 2024-07-28

## 2024-07-28 PROBLEM — O13.3 GESTATIONAL HYPERTENSION, THIRD TRIMESTER: Status: RESOLVED | Noted: 2024-05-31 | Resolved: 2024-07-28

## 2024-07-29 ENCOUNTER — POSTPARTUM VISIT (OUTPATIENT)
Dept: OBSTETRICS AND GYNECOLOGY | Facility: CLINIC | Age: 19
End: 2024-07-29
Payer: MEDICAID

## 2024-07-29 VITALS
SYSTOLIC BLOOD PRESSURE: 124 MMHG | HEIGHT: 62 IN | WEIGHT: 263 LBS | DIASTOLIC BLOOD PRESSURE: 80 MMHG | BODY MASS INDEX: 48.4 KG/M2

## 2024-07-29 DIAGNOSIS — Z11.3 ROUTINE SCREENING FOR STI (SEXUALLY TRANSMITTED INFECTION): ICD-10-CM

## 2024-07-29 DIAGNOSIS — R39.9 UTI SYMPTOMS: ICD-10-CM

## 2024-07-29 DIAGNOSIS — F41.8 POSTPARTUM ANXIETY: ICD-10-CM

## 2024-07-29 LAB — HOLD SPECIMEN: NORMAL

## 2024-07-29 PROCEDURE — 0503F POSTPARTUM CARE VISIT: CPT | Performed by: OBSTETRICS & GYNECOLOGY

## 2024-07-29 PROCEDURE — 87086 URINE CULTURE/COLONY COUNT: CPT | Performed by: OBSTETRICS & GYNECOLOGY

## 2024-07-29 PROCEDURE — 81001 URINALYSIS AUTO W/SCOPE: CPT | Performed by: OBSTETRICS & GYNECOLOGY

## 2024-07-29 RX ORDER — FLUOXETINE HYDROCHLORIDE 20 MG/1
20 CAPSULE ORAL DAILY
Qty: 30 CAPSULE | Refills: 0 | Status: SHIPPED | OUTPATIENT
Start: 2024-07-29

## 2024-07-29 NOTE — PROGRESS NOTES
"Subjective   Chief Complaint   Patient presents with    Postpartum Care     6w1d PP vaginal delivery     Liliam Romero is a 19 y.o. year old  presenting to be seen for her postpartum visit.  She had a vaginal delivery.  Her son is doing well.    Since delivery she has been sexually active.They did not use a condom. Last episode of intercourse was about ~ 4 days ago.   She does have concerns about post-partum blues/depression. She has had thoughts of self harm but no plan.   She has previously been on prozac in the past. She last took this around until right before she found out she was pregnant.   [place] Score = 21  She is bottle feeding.  For ongoing contraception, her plans are Nexplanon.  She desires  UTI and STI screening today.     The following portions of the patient's history were reviewed and updated as appropriate:current medications and allergies    Social History    Tobacco Use      Smoking status: Former        Packs/day: 0.00        Types: Cigarettes        Quit date:         Years since quittin.5        Passive exposure: Past      Smokeless tobacco: Never      Tobacco comments: Vaping       Review of Systems  Constitutional POS: nothing reported    NEG: anorexia or night sweats   Genitourinary POS: nothing reported    NEG: dysuria or hematuria      Gastointestinal POS: nothing reported    NEG: bloating, change in bowel habits, melena, or reflux symptoms   Breast POS: nothing reported    NEG: persistent breast lump, skin dimpling, or nipple discharge        Objective   /80 (BP Location: Right arm, Patient Position: Sitting, Cuff Size: Adult)   Ht 157.5 cm (62\")   Wt 119 kg (263 lb)   LMP 2023   Breastfeeding No   BMI 48.10 kg/m²     General: well developed; well nourished  no acute distress   Skin: No suspicious lesions seen   Thyroid: normal to inspection and palpation   Heart:  Not performed.   Lungs: breathing is unlabored   Breasts:  Examined in supine " position  Symmetric without masses or skin dimpling  Nipples normal without inversion, lesions or discharge  There are no palpable axillary nodes   Abdomen: soft, non-tender; no masses  no umbilical or inguinal hernias are present  no hepato-splenomegaly   Pelvis: Clinical staff was present for exam  External genitalia:  normal appearance of the external genitalia including Bartholin's and [place]'s glands. Vitiligo present  :  urethral meatus normal;  Vaginal:  normal pink mucosa without prolapse or lesions.  Cervix:  normal appearance.  Uterus:  normal size, shape and consistency.  Adnexa:  normal bimanual exam of the adnexa.  Rectal:  digital rectal exam not performed; anus visually normal appearing.        Assessment   Normal 6 week postpartum exam  Postpartum depression and anxiety      Plan   BC options reviewed and compared today: Nexplanon  Restart prozac. Reviewed SI and HI precautions in detail.   Pap was done today.  If she does not receive the results of the Pap within 2 weeks  time, she was instructed to call to find out the results.  I explained to Liliam that the recommendations for Pap smear interval in a low risk patient's has lengthened to 3 years time.  I encouraged her to be seen yearly for a full physical exam including breast and pelvic exam even during the off years when PAP's will not be performed.  The following tests were ordered today: UA with culture if indicated and STI screening from pap .  It was explained to Liliam that all lab test should be back within the one week after they are performed. She will be notified about the results, regardless of the findings. If she has not been contacted by the office within 2 weeks after the test has been performed, it is her responsibility to contact us to learn about her results.  The importance of keeping all planned follow-up and taking all medications as prescribed was emphasized.  Follow up for annual exam in ~ one year and in ~ 2 weeks for  mood/new med follow up and nexplanon follow up.  Reviewed no unprotected intercourse for 2 weeks prior to that visit.    No orders of the defined types were placed in this encounter.         This note was electronically signed.    Belinda Kirk MD  July 29, 2024

## 2024-07-30 LAB
BACTERIA UR QL AUTO: ABNORMAL /HPF
BILIRUB UR QL STRIP: NEGATIVE
CLARITY UR: CLEAR
COLOR UR: YELLOW
GLUCOSE UR STRIP-MCNC: NEGATIVE MG/DL
HGB UR QL STRIP.AUTO: NEGATIVE
HYALINE CASTS UR QL AUTO: ABNORMAL /LPF
KETONES UR QL STRIP: NEGATIVE
LEUKOCYTE ESTERASE UR QL STRIP.AUTO: ABNORMAL
NITRITE UR QL STRIP: NEGATIVE
PH UR STRIP.AUTO: 6 [PH] (ref 5–8)
PROT UR QL STRIP: NEGATIVE
RBC # UR STRIP: ABNORMAL /HPF
REF LAB TEST METHOD: ABNORMAL
SP GR UR STRIP: 1.02 (ref 1–1.03)
SQUAMOUS #/AREA URNS HPF: ABNORMAL /HPF
UROBILINOGEN UR QL STRIP: ABNORMAL
WBC # UR STRIP: ABNORMAL /HPF

## 2024-07-31 LAB — BACTERIA SPEC AEROBE CULT: NORMAL

## 2024-07-31 RX ORDER — NITROFURANTOIN 25; 75 MG/1; MG/1
100 CAPSULE ORAL 2 TIMES DAILY
Qty: 14 CAPSULE | Refills: 0 | Status: SHIPPED | OUTPATIENT
Start: 2024-07-31 | End: 2024-08-07

## 2024-08-05 LAB — REF LAB TEST METHOD: NORMAL

## 2024-08-07 PROBLEM — Z01.419 WELL WOMAN EXAM: Status: ACTIVE | Noted: 2024-08-07

## 2024-08-16 ENCOUNTER — PATIENT OUTREACH (OUTPATIENT)
Dept: LABOR AND DELIVERY | Facility: HOSPITAL | Age: 19
End: 2024-08-16
Payer: MEDICAID

## 2024-08-16 ENCOUNTER — OFFICE VISIT (OUTPATIENT)
Dept: OBSTETRICS AND GYNECOLOGY | Facility: CLINIC | Age: 19
End: 2024-08-16
Payer: MEDICAID

## 2024-08-16 VITALS
HEIGHT: 62 IN | DIASTOLIC BLOOD PRESSURE: 80 MMHG | BODY MASS INDEX: 48.21 KG/M2 | WEIGHT: 262 LBS | SYSTOLIC BLOOD PRESSURE: 120 MMHG

## 2024-08-16 DIAGNOSIS — F41.8 POSTPARTUM ANXIETY: ICD-10-CM

## 2024-08-16 DIAGNOSIS — Z30.017 NEXPLANON INSERTION: ICD-10-CM

## 2024-08-16 LAB
B-HCG UR QL: NEGATIVE
EXPIRATION DATE: NORMAL
INTERNAL NEGATIVE CONTROL: NEGATIVE
INTERNAL POSITIVE CONTROL: POSITIVE
Lab: NORMAL

## 2024-08-16 RX ORDER — FLUOXETINE HYDROCHLORIDE 20 MG/1
20 CAPSULE ORAL DAILY
Qty: 30 CAPSULE | Refills: 11 | Status: SHIPPED | OUTPATIENT
Start: 2024-08-16 | End: 2025-08-16

## 2024-08-16 NOTE — PROGRESS NOTES
"Subjective   Chief Complaint   Patient presents with    Contraception     Nexplanon insertion / Mood check     Liliam Romero is a 19 y.o. year old .  Patient's last menstrual period was 2024 (exact date).  She presents to be seen because of mood follow up and nexplanon insertion. She reports restarting her prozac has significantly improved her mood and she would like to continue. She has previously seen a therapist and will try to reach back out to them. She does not have a PCP but is okay with me putting in a referral. She desires to continue the prozac.    OTHER THINGS SHE WANTS TO DISCUSS TODAY:  Nothing else    The following portions of the patient's history were reviewed and updated as appropriate:current medications and allergies    Social History    Tobacco Use      Smoking status: Former        Packs/day: 0.00        Types: Cigarettes        Quit date:         Years since quittin.6        Passive exposure: Past      Smokeless tobacco: Never      Tobacco comments: Vaping     Review of Systems  Constitutional POS: nothing reported    NEG: anorexia or night sweats   Genitourinary POS: nothing reported    NEG: dysuria or hematuria   Gastointestinal POS: nothing reported    NEG: bloating, change in bowel habits, melena, or reflux symptoms   Integument POS: nothing reported    NEG: moles that are changing in size, shape, color or rashes   Breast POS: nothing reported    NEG: persistent breast lump, skin dimpling, or nipple discharge         Objective   /80 (BP Location: Left arm, Patient Position: Sitting, Cuff Size: Large Adult)   Ht 157.5 cm (62\")   Wt 119 kg (262 lb)   LMP 2024 (Exact Date)   Breastfeeding No   BMI 47.92 kg/m²     General:  well developed; well nourished  no acute distress   Skin:  Not performed.   Thyroid: not examined   Lungs:  breathing is unlabored   Heart:  Not performed.   Breasts:  Not performed.   Abdomen: Not performed.   Pelvis: Not " performed.     Lab Review   STD swabs for GC, chlamydia, and trichimoniasis, UA, Urine culture, and PAP    Imaging   No data reviewed        Assessment   Postpartum anxiety/depression improved on prozac  Encounter for nexplanon insertion - see procedure note     Plan   Reviewed okay to continue the prozac and precautions for calling the office. Refills sent. Encouraged to re-establish with therapist and referral placed for PCP  The importance of keeping all planned follow-up and taking all medications as prescribed was emphasized.  Follow up for annual exam in ~ one year or soon prn.    New Medications Ordered This Visit   Medications    Etonogestrel (NEXPLANON) 68 MG implant subdermal implant     Sig: To be inserted one time by prescriber. Route Subdermal.    Etonogestrel (NEXPLANON) 68 MG subdermal implant 1 each    FLUoxetine (PROzac) 20 MG capsule     Sig: Take 1 capsule by mouth Daily.     Dispense:  30 capsule     Refill:  11          This note was electronically signed.    Belinda Kirk MD  August 16, 2024

## 2024-08-16 NOTE — PROGRESS NOTES
Nexplanon Insertion    Patient's last menstrual period was 08/05/2024 (exact date).    Date of procedure:  8/16/2024    Risks and benefits discussed? yes  All questions answered? yes  Consents given by the patient  Written consent obtained? yes    Local anesthesia used:  yes - 3 cc's of  Meds; anesthesia local: None, 1% lidocaine    Procedure documentation:    The upper left arm (non-dominant) was marked at the intended site of insertion.  Betadine was used to cleanse the skin.  Local anesthesia was injected.  The Nexplanon was placed subdermally without difficulty.  The devise was able to be palpated in the arm by both myself and Liliam.  Steri-strips were then placed across the site of insertion and the arm was wrapped.    She tolerated the procedure well.  There were no complications.  EBL was minimal.    Post procedure instructions: Remove the wrapping in 24 hours and the steri-strips in 5 days.    Follow up needed: PRN    This note was electronically signed.    Belinda Kirk MD  August 16, 2024

## 2024-08-16 NOTE — OUTREACH NOTE
Motherhood Connection    We were contacted by Dr Kirk's office staff in regards to patient having issues with transportation to appointments for herself and the baby.  Attempted to reach her by phone but her number is not accepting calls currently. [employer] message sent with resources for Medical Transport in [facility].  Encouraged her to call with any additional questions or concerns.     Soumya Ferguson RN  Maternity Nurse Navigator    8/16/2024, 17:06 EDT